# Patient Record
Sex: MALE | HISPANIC OR LATINO | Employment: PART TIME | URBAN - METROPOLITAN AREA
[De-identification: names, ages, dates, MRNs, and addresses within clinical notes are randomized per-mention and may not be internally consistent; named-entity substitution may affect disease eponyms.]

---

## 2019-07-05 ENCOUNTER — HOSPITAL ENCOUNTER (EMERGENCY)
Facility: CLINIC | Age: 19
Discharge: HOME | End: 2019-07-05
Attending: FAMILY MEDICINE | Admitting: FAMILY MEDICINE
Payer: COMMERCIAL

## 2019-07-05 ENCOUNTER — APPOINTMENT (OUTPATIENT)
Dept: GENERAL RADIOLOGY | Facility: CLINIC | Age: 19
End: 2019-07-05
Attending: FAMILY MEDICINE
Payer: COMMERCIAL

## 2019-07-05 VITALS
HEART RATE: 86 BPM | DIASTOLIC BLOOD PRESSURE: 71 MMHG | WEIGHT: 270.38 LBS | TEMPERATURE: 97.9 F | SYSTOLIC BLOOD PRESSURE: 127 MMHG | RESPIRATION RATE: 18 BRPM | OXYGEN SATURATION: 99 % | BODY MASS INDEX: 42.99 KG/M2

## 2019-07-05 DIAGNOSIS — M54.6 ACUTE LEFT-SIDED THORACIC BACK PAIN: ICD-10-CM

## 2019-07-05 PROCEDURE — 99284 EMERGENCY DEPT VISIT MOD MDM: CPT | Mod: 25 | Performed by: FAMILY MEDICINE

## 2019-07-05 PROCEDURE — 71045 X-RAY EXAM CHEST 1 VIEW: CPT

## 2019-07-05 PROCEDURE — 99284 EMERGENCY DEPT VISIT MOD MDM: CPT | Mod: Z6 | Performed by: FAMILY MEDICINE

## 2019-07-05 PROCEDURE — 72072 X-RAY EXAM THORAC SPINE 3VWS: CPT

## 2019-07-05 PROCEDURE — 25000132 ZZH RX MED GY IP 250 OP 250 PS 637: Performed by: FAMILY MEDICINE

## 2019-07-05 RX ORDER — ACETAMINOPHEN 500 MG
1000 TABLET ORAL ONCE
Status: COMPLETED | OUTPATIENT
Start: 2019-07-05 | End: 2019-07-05

## 2019-07-05 RX ADMIN — ACETAMINOPHEN 1000 MG: 500 TABLET ORAL at 12:17

## 2019-07-05 SDOH — HEALTH STABILITY: MENTAL HEALTH: HOW OFTEN DO YOU HAVE A DRINK CONTAINING ALCOHOL?: NEVER

## 2019-07-05 ASSESSMENT — ENCOUNTER SYMPTOMS: BACK PAIN: 1

## 2019-07-05 NOTE — ED AVS SNAPSHOT
[facility], [place], Emergency Department  [address]  [place] [address]  Phone:  [phone]  Fax:  [phone]                                    Jaxon Yeung   MRN: [id number]    Department:  [facility], Emergency Department   Date of Visit:  7/5/2019           After Visit Summary Signature Page    I have received my discharge instructions, and my questions have been answered. I have discussed any challenges I see with this plan with the nurse or doctor.    ..........................................................................................................................................  Patient/Patient Representative Signature      ..........................................................................................................................................  Patient Representative Print Name and Relationship to Patient    ..................................................               ................................................  Date                                   Time    ..........................................................................................................................................  Reviewed by Signature/Title    ...................................................              ..............................................  Date                                               Time          22EPIC Rev 08/18

## 2019-07-05 NOTE — ED NOTES
Pt states no left scapula/back pain since taking the Ibu PTA.  Pt has FROM/CMS intact at present.  Pt states the pain was so bad yesterday that the PT could not get out of bed fast enough and as a result urinated on himself.

## 2019-07-05 NOTE — ED PROVIDER NOTES
[facility] [place] EMERGENCY DEPARTMENT ([facility])     July 5, 2019    History     Chief Complaint   Patient presents with     Back Pain     Left upper back pain      HPI  Jaxon Yeung is a 19 year old male with history for previous L3 pars interarticularis fracture (2015), autism spectrum disorder, congenital single kidney, and cerebral palsy who presents to the ED for 2 days of ongoing upper back pain.  Patient states that he developed sudden onset of left upper back pain. No trauma. He states that the pain is made worse with standing up and movement. Patient states that he has been taking 200 mg of ibuprofen for pain and last took it sometime between 7:20 and 7:30 AM today.  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 No pertinent surgical history.  FAMILY HISTORY  Family History   Problem Relation Age of Onset     Coronary Artery Disease Mother      Asthma Mother      Cerebrovascular Disease Father      SOCIAL HISTORY  Social History     Tobacco Use     Smoking status: Never Smoker     Smokeless tobacco: Never Used   Substance Use Topics     Alcohol use: Never     Frequency: Never     MEDICATIONS  No current facility-administered medications for this encounter.      Current Outpatient Medications   Medication     acetaminophen (TYLENOL) 325 MG tablet     albuterol (2.5 MG/3ML) 0.083% nebulizer solution     ibuprofen (ADVIL,MOTRIN) 600 MG tablet     loratadine (CLARITIN) 10 MG tablet     VITAMIN D, CHOLECALCIFEROL, PO     ALLERGIES  Allergies   Allergen Reactions     Clindamycin        I have reviewed the Medications, Allergies, Past Medical and Surgical History, and Social History in the Epic system.    Review of Systems    Constitutional: Negative for chills and fever.   HENT: Negative for congestion.    Respiratory: Negative for cough and shortness of breath.    Cardiovascular: Negative for palpitations.   Gastrointestinal: Negative for nausea and vomiting.   Genitourinary: Negative for flank pain.   Musculoskeletal: Positive for back pain (upper). Negative for neck pain and neck stiffness.   Skin: Negative.    Allergic/Immunologic: Negative.    Hematological: Negative.    All other systems reviewed and are negative.      Physical Exam   BP: 127/71  Pulse: 86  Temp: 97.9  F (36.6  C)  Resp: 18  Weight: 122.6 kg (270 lb 6 oz)  SpO2: 99 %      Physical Exam   Constitutional: He is oriented to person, place, and time.   Mildly obese  No distress  To demonstrate the pain, the pt twists the upper thorax   Cardiovascular: Normal rate, regular rhythm, normal heart sounds and intact distal pulses.   No murmur heard.  Pulmonary/Chest: Effort normal and breath sounds normal. No respiratory distress. He exhibits tenderness (left lateral chest wall).   Abdominal: Soft.   Musculoskeletal: He exhibits no edema or tenderness.   Neurological: He is alert and oriented to person, place, and time.   Skin: Skin is warm and dry.   Nursing note and vitals reviewed.      ED Course        Procedures   11:05 AM  The patient was seen and examined by Dr. Rosenbaum in Room 2.           This discomfort is consistent with skeletal left posterior back. He has no hx of clots or thromboembolic issues. No immobilizations    xrays of thoracic back and cxr are normal with no lytic lesions or fractures  Labs Ordered and Resulted from Time of ED Arrival Up to the Time of Departure from the ED - No data to display         Assessments & Plan (with Medical Decision Making)     I have reviewed the nursing notes.    I have reviewed the findings, diagnosis, plan and need for follow up with the patient.       Medication List      There are no discharge medications for this  visit.         Final diagnoses:   Acute left-sided thoracic back pain     IKarri, am serving as a trained medical scribe to document services personally performed by Wade Rosenbaum MD, based on the provider's statements to me.      Wade CALLAHAN MD, was physically present and have reviewed and verified the accuracy of this note documented by Karri Richardson.     7/5/2019   [facility], [place], EMERGENCY DEPARTMENT     Wade Rosenbaum MD  07/10/19 0043

## 2019-07-05 NOTE — DISCHARGE INSTRUCTIONS
Ice to all that hurts for 10 minutes several times per day for several days  Then begin gentle warm packs  Advil 2 tablets every 4 hours  If needed as a back up, one gram of tylenol 3 times per day  If not significantly better in 3 days, recheck at your clinic or return here if cannot get in but the clinic would by far be the best- minimal waiting with appointment    You have a strain in the muscles of the upper left back- the bones appear well and there are no fractures or problems apparent.  The lungs are also fine

## 2019-07-09 ENCOUNTER — OFFICE VISIT (OUTPATIENT)
Dept: FAMILY MEDICINE | Facility: CLINIC | Age: 19
End: 2019-07-09
Payer: COMMERCIAL

## 2019-07-09 VITALS
OXYGEN SATURATION: 98 % | WEIGHT: 264.7 LBS | DIASTOLIC BLOOD PRESSURE: 70 MMHG | HEIGHT: 67 IN | BODY MASS INDEX: 41.55 KG/M2 | HEART RATE: 74 BPM | SYSTOLIC BLOOD PRESSURE: 126 MMHG

## 2019-07-09 DIAGNOSIS — F84.0 AUTISM: ICD-10-CM

## 2019-07-09 DIAGNOSIS — G80.8 OTHER CEREBRAL PALSY (H): Primary | ICD-10-CM

## 2019-07-09 DIAGNOSIS — E66.01 MORBID OBESITY (H): ICD-10-CM

## 2019-07-09 PROCEDURE — 99203 OFFICE O/P NEW LOW 30 MIN: CPT | Performed by: PHYSICIAN ASSISTANT

## 2019-07-09 ASSESSMENT — MIFFLIN-ST. JEOR: SCORE: 2164.3

## 2019-07-09 NOTE — PATIENT INSTRUCTIONS
Call weight clinic to schedule  You'll receive a call from [facility] whether you're accepted or not  Return to clinic for any new or worsening symptoms or go to ER Urgent care in off hours

## 2019-07-09 NOTE — PROGRESS NOTES
Jaxon Yeung is a 19 year old male who presents to clinic today with his mother for the following health issues:    -Patient is here for a referral to [facility]  -His older brother has been seen at [facility]  -Mother states that he has been diagnosed with mild cerebral palsy, mild autism  -He is not on any psychiatric meds, doesn't believe he needs to be  -Patient notes that seeing a nutritionist could be helpful for him to lose weight    Family Medical History  -His aunt, sister have thyroid problems    Problem list and histories reviewed & adjusted, as indicated.  Additional history: as documented    ROS:  CONSTITUTIONAL: NEGATIVE for fever, chills, change in weight  INTEGUMENTARY/SKIN: NEGATIVE for worrisome rashes, moles or lesions  EYES: NEGATIVE for vision changes or irritation  ENT/MOUTH: NEGATIVE for ear, mouth and throat problems  RESP: NEGATIVE for significant cough or SOB  BREAST: NEGATIVE for masses, tenderness or discharge  CV: NEGATIVE for chest pain, palpitations or peripheral edema  GI: NEGATIVE for nausea, abdominal pain, heartburn, or change in bowel habits  : NEGATIVE for frequency, dysuria, or hematuria  MUSCULOSKELETAL: NEGATIVE for significant arthralgias or myalgia  NEURO: NEGATIVE for weakness, dizziness or paresthesias  ENDOCRINE: NEGATIVE for temperature intolerance, skin/hair changes  HEME: NEGATIVE for bleeding problems  PSYCHIATRIC: NEGATIVE for changes in mood or affect    This document serves as a record of the services and decisions personally performed and made by Margoth Robles PA-C. It was created on her behalf by Perry Oconnell, trained medical scribe. The creation of this document is based on the provider's statements to the medical scribe.  Perry Oconnell 8:47 AM July 9, 2019    Patient Active Problem List   Diagnosis     Closed spondylolysis fracture of lumbar vertebra     Spondylolisthesis of lumbar region     Morbid obesity (H)     No past surgical history on file.   "  Social History     Tobacco Use     Smoking status: Never Smoker     Smokeless tobacco: Never Used   Substance Use Topics     Alcohol use: Never     Frequency: Never     Family History   Problem Relation Age of Onset     Coronary Artery Disease Mother      Asthma Mother      Cerebrovascular Disease Father            Labs reviewed in EPIC  Patient Active Problem List   Diagnosis     Closed spondylolysis fracture of lumbar vertebra     Spondylolisthesis of lumbar region     Morbid obesity (H)     No past surgical history on file.    Social History     Tobacco Use     Smoking status: Never Smoker     Smokeless tobacco: Never Used   Substance Use Topics     Alcohol use: Never     Frequency: Never     Family History   Problem Relation Age of Onset     Coronary Artery Disease Mother      Asthma Mother      Cerebrovascular Disease Father          Current Outpatient Medications   Medication Sig Dispense Refill     loratadine (CLARITIN) 10 MG tablet Take 10 mg by mouth daily       VITAMIN D, CHOLECALCIFEROL, PO Take 1,000 Units by mouth daily       acetaminophen (TYLENOL) 325 MG tablet Take 1-2 q 8 hours prn pain (Patient not taking: Reported on 7/9/2019) 100 tablet 0     albuterol (2.5 MG/3ML) 0.083% nebulizer solution Take 1 vial by nebulization every 6 hours as needed for shortness of breath / dyspnea or wheezing       ibuprofen (ADVIL,MOTRIN) 600 MG tablet Take 1 tablet (600 mg) by mouth every 6 hours as needed for pain (Patient not taking: Reported on 7/9/2019) 60 tablet 0       OBJECTIVE:                                                    /70   Pulse 74   Ht 1.702 m (5' 7\")   Wt 120.1 kg (264 lb 11.2 oz)   SpO2 98%   BMI 41.46 kg/m   Body mass index is 41.46 kg/m .   GENERAL:: healthy, alert and no distress  EYES: Eyes grossly normal to inspection, extraocular movements - intact, and PERRL  HENT: ear canals- normal; TMs- normal; Nose- normal; Mouth- no ulcers, no lesions  NECK: no tenderness, no adenopathy, " "no asymmetry, no masses, no stiffness; thyroid- normal to palpation  RESP: lungs clear to auscultation - no rales, no rhonchi, no wheezes  CV: regular rates and rhythm, normal S1 S2, no S3 or S4 and no murmur, no click or rub -  MS: extremities- no gross deformities noted, no edema  SKIN: no suspicious lesions, no rashes  NEURO: strength and tone- normal, sensory exam- grossly normal, mentation- intact, speech- normal, reflexes- symmetric  PSYCH: Alert and oriented times 3; speech- coherent , normal rate and volume; able to articulate logical thoughts, able to abstract reason, no tangential thoughts, no hallucinations or delusions, affect- normal    No results found for this or any previous visit (from the past 24 hour(s)).       ASSESSMENT/PLAN:                                                        ICD-10-CM    1. Other cerebral palsy (H) G80.8 [facility] REFERRAL   2. Morbid obesity (H) E66.01 [facility] REFERRAL     BARIATRIC ADULT REFERRAL   3. Autism F84.0 [facility] REFERRAL     Advised patient he may not qualify for [facility], but he and his mother insisted we try to get him. See referral note. See below. Return to clinic for any new or worsening symptoms or go to ER Urgent care in off hours     Patient Instructions   Call weight clinic to schedule  You'll receive a call from [facility] whether you're accepted or not  Return to clinic for any new or worsening symptoms or go to ER Urgent care in off hours        Estimated body mass index is 41.46 kg/m  as calculated from the following:    Height as of this encounter: 1.702 m (5' 7\").    Weight as of this encounter: 120.1 kg (264 lb 11.2 oz).     The information in this document, created by the medical scribe for me, accurately reflects the services I personally performed and the decisions made by me. I have reviewed and approved this document for accuracy prior to leaving the patient care area.  July 9, 2019 8:47 AM    Margoth" Dania Robles  [facility] [place]

## 2019-07-10 ASSESSMENT — ENCOUNTER SYMPTOMS
ALLERGIC/IMMUNOLOGIC NEGATIVE: 1
PALPITATIONS: 0
HEMATOLOGIC/LYMPHATIC NEGATIVE: 1
COUGH: 0
SHORTNESS OF BREATH: 0
CHILLS: 0
NAUSEA: 0
NECK STIFFNESS: 0
FLANK PAIN: 0
NECK PAIN: 0
FEVER: 0
VOMITING: 0

## 2022-02-16 ENCOUNTER — LAB (OUTPATIENT)
Dept: LAB | Facility: CLINIC | Age: 22
End: 2022-02-16
Payer: COMMERCIAL

## 2022-02-16 ENCOUNTER — OFFICE VISIT (OUTPATIENT)
Dept: FAMILY MEDICINE | Facility: CLINIC | Age: 22
End: 2022-02-16
Payer: COMMERCIAL

## 2022-02-16 VITALS
WEIGHT: 265.2 LBS | HEART RATE: 61 BPM | HEIGHT: 67 IN | SYSTOLIC BLOOD PRESSURE: 128 MMHG | DIASTOLIC BLOOD PRESSURE: 82 MMHG | BODY MASS INDEX: 41.62 KG/M2 | OXYGEN SATURATION: 97 %

## 2022-02-16 DIAGNOSIS — E66.01 MORBID OBESITY (H): ICD-10-CM

## 2022-02-16 DIAGNOSIS — Z00.00 HEALTHCARE MAINTENANCE: ICD-10-CM

## 2022-02-16 DIAGNOSIS — Q60.0 CONGENITAL ABSENCE OF ONE KIDNEY: ICD-10-CM

## 2022-02-16 DIAGNOSIS — Z00.00 HEALTHCARE MAINTENANCE: Primary | ICD-10-CM

## 2022-02-16 DIAGNOSIS — J45.20 MILD INTERMITTENT ASTHMA WITHOUT COMPLICATION: ICD-10-CM

## 2022-02-16 LAB
ALBUMIN SERPL-MCNC: 4 G/DL (ref 3.4–5)
ALP SERPL-CCNC: 88 U/L (ref 40–150)
ALT SERPL W P-5'-P-CCNC: 48 U/L (ref 0–70)
ANION GAP SERPL CALCULATED.3IONS-SCNC: 8 MMOL/L (ref 3–14)
AST SERPL W P-5'-P-CCNC: 17 U/L (ref 0–45)
BILIRUB SERPL-MCNC: 0.8 MG/DL (ref 0.2–1.3)
BUN SERPL-MCNC: 16 MG/DL (ref 7–30)
CALCIUM SERPL-MCNC: 8.9 MG/DL (ref 8.5–10.1)
CHLORIDE BLD-SCNC: 106 MMOL/L (ref 94–109)
CHOLEST SERPL-MCNC: 171 MG/DL
CO2 SERPL-SCNC: 27 MMOL/L (ref 20–32)
CREAT SERPL-MCNC: 1.02 MG/DL (ref 0.66–1.25)
DEPRECATED CALCIDIOL+CALCIFEROL SERPL-MC: 25 UG/L (ref 20–75)
FASTING STATUS PATIENT QL REPORTED: YES
GFR SERPL CREATININE-BSD FRML MDRD: >90 ML/MIN/1.73M2
GLUCOSE BLD-MCNC: 95 MG/DL (ref 70–99)
HBA1C MFR BLD: 5.4 % (ref 0–5.6)
HDLC SERPL-MCNC: 27 MG/DL
LDLC SERPL CALC-MCNC: 98 MG/DL
NONHDLC SERPL-MCNC: 144 MG/DL
POTASSIUM BLD-SCNC: 3.8 MMOL/L (ref 3.4–5.3)
PROT SERPL-MCNC: 7.7 G/DL (ref 6.8–8.8)
SODIUM SERPL-SCNC: 141 MMOL/L (ref 133–144)
TRIGL SERPL-MCNC: 230 MG/DL
TSH SERPL DL<=0.005 MIU/L-ACNC: 3.11 MU/L (ref 0.4–4)

## 2022-02-16 PROCEDURE — 82306 VITAMIN D 25 HYDROXY: CPT | Mod: 90 | Performed by: PATHOLOGY

## 2022-02-16 PROCEDURE — 80053 COMPREHEN METABOLIC PANEL: CPT | Performed by: PATHOLOGY

## 2022-02-16 PROCEDURE — 80061 LIPID PANEL: CPT | Performed by: PATHOLOGY

## 2022-02-16 PROCEDURE — 99385 PREV VISIT NEW AGE 18-39: CPT | Performed by: NURSE PRACTITIONER

## 2022-02-16 PROCEDURE — 36415 COLL VENOUS BLD VENIPUNCTURE: CPT | Performed by: PATHOLOGY

## 2022-02-16 PROCEDURE — 99000 SPECIMEN HANDLING OFFICE-LAB: CPT | Performed by: PATHOLOGY

## 2022-02-16 PROCEDURE — 84443 ASSAY THYROID STIM HORMONE: CPT | Performed by: PATHOLOGY

## 2022-02-16 PROCEDURE — 83036 HEMOGLOBIN GLYCOSYLATED A1C: CPT | Performed by: PATHOLOGY

## 2022-02-16 RX ORDER — ALBUTEROL SULFATE 90 UG/1
2 AEROSOL, METERED RESPIRATORY (INHALATION) EVERY 6 HOURS PRN
Qty: 8 G | Refills: 1 | Status: SHIPPED | OUTPATIENT
Start: 2022-02-16 | End: 2024-08-28

## 2022-02-16 ASSESSMENT — ENCOUNTER SYMPTOMS
SORE THROAT: 0
WEAKNESS: 0
HEADACHES: 0
NUMBNESS: 0
ABDOMINAL PAIN: 0
FATIGUE: 0
BRUISES/BLEEDS EASILY: 0
DYSPHORIC MOOD: 0
DIARRHEA: 0
MYALGIAS: 0
NAUSEA: 0
NERVOUS/ANXIOUS: 0
CHILLS: 0
EYE DISCHARGE: 0
JOINT SWELLING: 0
COUGH: 0
DIFFICULTY URINATING: 0
PALPITATIONS: 0
WHEEZING: 0
WOUND: 0
VOMITING: 0
COLOR CHANGE: 1
FEVER: 0
ADENOPATHY: 0
SHORTNESS OF BREATH: 0

## 2022-02-16 NOTE — PATIENT INSTRUCTIONS
- Use Cerave Lotion 2x/day for dry hands  - No more Q-tips in ears.  Use over-the- counter Debox for ear wax.  - I have ordered labs, please obtain them at your nearest convenience.      - Patient Education     Exercise for a Healthier Heart  You may wonder how you can improve the health of your heart. If you re thinking about exercise, you re on the right track. You don t need to become an athlete. But you do need a certain amount of brisk exercise to help strengthen your heart. If you have been diagnosed with a heart condition, your healthcare provider may advise exercise to help stabilize your condition. To help make exercise a habit, choose safe, fun activities.      Exercise with a friend. When activity is fun, you're more likely to stick with it.   Before you start  Check with your healthcare provider before starting an exercise program. This is especially important if you have not been active for a while. It's also important if you have a long-term (chronic) health problem such as heart disease, diabetes, or obesity. Or if you are at high risk for having these problems.   Why exercise?  Exercising regularly offers many healthy rewards. It can help you do all of the following:     Improve your blood cholesterol level to help prevent further heart trouble    Lower your blood pressure to help prevent a stroke or heart attack    Control diabetes, or reduce your risk of getting this disease    Improve your heart and lung function    Reach and stay at a healthy weight    Make your muscles stronger so you can stay active    Prevent falls and fractures by slowing the loss of bone mass (osteoporosis)    Manage stress better    Reduce your blood pressure    Improve your sense of self and your body image  Exercise tips      Ease into your routine. Set small goals. Then build on them. If you are not sure what your activity level should be, talk with your healthcare provider first before starting an exercise  routine.    Exercise on most days. Aim for a total of 150 minutes (2 hours and 30 minutes) or more of moderate-intensity aerobic activity each week. Or 75 minutes (1 hour and 15 minutes) or more of vigorous-intensity aerobic activity each week. Or try for a combination of both. Moderate activity means that you breathe heavier and your heart rate increases but you can still talk. Think about doing 40 minutes of moderate exercise, 3 to 4 times a week. For best results, activity should last for about 40 minutes to lower blood pressure and cholesterol. It's OK to work up to the 40-minute period over time. Examples of moderate-intensity activity are walking 1 mile in 15 minutes. Or doing 30 to 45 minutes of yard work.    Step up your daily activity level.  Along with your exercise program, try being more active the whole day. Walk instead of drive. Or park further away so that you take more steps each day. Do more household tasks or yard work. You may not be able to meet the advised mount of physical activity. But doing some moderate- or vigorous-intensity aerobic activity can help reduce your risk for heart disease. Your healthcare provider can help you figure out what is best for you.    Choose 1 or more activities you enjoy.  Walking is one of the easiest things you can do. You can also try swimming, riding a bike, dancing, or taking an exercise class.    When to call your healthcare provider  Call your healthcare provider if you have any of these:     Chest pain or feel dizzy or lightheaded    Burning, tightness, pressure, or heaviness in your chest, neck, shoulders, back, or arms    Abnormal shortness of breath    More joint or muscle pain    A very fast or irregular heartbeat (palpitations)  Je last reviewed this educational content on 7/1/2019 2000-2021 [employer]. All rights reserved. This information is not intended as a substitute for professional medical care. Always follow your  healthcare professional's instructions.

## 2022-02-16 NOTE — PROGRESS NOTES
"Today's Date: Feb 16, 2022     Patient Jaxon Yeung 2000 presents to the clinic today to address   Chief Complaint   Patient presents with     Establish Care     Issues with dry skin             SUBJECTIVE     History of Present Illness: 21 year old male presents for physical exam. Patient reports that he has been having dry hands during the winter months. Currently not using any lotion. Denies any associated symptoms including redness, swelling, warmth, fevers. No other acute complaints/symptoms at time of exam.    Hx Asthma- Endorses inhaler use once \"every few months.\" Denies current cough, SOB, wheezing.    Hx Low vitamin D however no past results noted.      Review of Systems   Constitutional: Negative for chills, fatigue and fever.   HENT: Negative for congestion and sore throat.    Eyes: Negative for discharge and visual disturbance.   Respiratory: Negative for cough, shortness of breath and wheezing.    Cardiovascular: Negative for chest pain, palpitations and peripheral edema.   Gastrointestinal: Negative for abdominal pain, diarrhea, nausea and vomiting.   Endocrine: Negative for cold intolerance and heat intolerance.   Genitourinary: Negative for difficulty urinating.   Musculoskeletal: Negative for joint swelling and myalgias.   Skin: Positive for color change (Endorses dry skin to hands). Negative for rash and wound.   Allergic/Immunologic: Negative for environmental allergies and food allergies.   Neurological: Negative for weakness, numbness and headaches.   Hematological: Negative for adenopathy. Does not bruise/bleed easily.   Psychiatric/Behavioral: Negative for dysphoric mood and suicidal ideas. The patient is not nervous/anxious.          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     Immunization History   Administered Date(s) Administered     DTAP (<7y) 2000, 2000, 1998, 08/26/1999, 09/17/2002     DTAP-IPV/HIB (PENTACEL) 1998     FLU 6-35 months 11/05/2009, 11/04/2013     HPV Quadrivalent 07/25/2012, 08/20/2013, 03/12/2014     Hep B, Peds or Adolescent 1998     HepA-ped 2 Dose 01/17/2008, 05/14/2009     HepB, Unspecified 1998, 01/12/1999     Hepb Ig, Im (hbig) 1998     Hib, Unspecified 1998, 1998     Historic Hib Prohibit 1998, 08/26/1999     Historical DTP/aP 05/25/1999     Influenza (IIV3) PF 11/04/1999, 02/01/2005, 01/17/2008, 08/23/2012     Influenza Vaccine, 6+MO IM (QUADRIVALENT W/PRESERVATIVES) 11/04/2015, 02/07/2017     MMR 05/25/1999, 09/17/2002     Meningococcal (Menactra ) 09/08/2010     Meningococcal (Menveo ) 08/12/2014     OPV, trivalent, live 08/26/1999     Poliovirus, inactivated (IPV) 1998, 1998, 05/25/1999, 09/17/2002     TDAP Vaccine (Adacel) 05/14/2009     Varicella 05/25/1999, 01/17/2008        Health Maintenance components reviewed - Seasonal Influenza vaccine status is due & Covid-19 vaccine status is due. Patient declined both.         OBJECTIVE     /82 (BP  "Location: Right arm, Patient Position: Sitting, Cuff Size: Adult Large)   Pulse 61   Ht 1.702 m (5' 7\")   Wt 120.3 kg (265 lb 3.2 oz)   SpO2 97%   BMI 41.54 kg/m       Labs:  No results found for: WBC, HGB, HCT, PLT, CHOL, TRIG, HDL, LDLDIRECT, ALT, AST, NA, CREATININE, BUN, CO2, TSH, PSA, INR, GLUF, HGBA1C, MICROALBUR     Physical Exam  Constitutional:       General: He is not in acute distress.     Appearance: He is well-developed. He is obese. He is not toxic-appearing.   HENT:      Right Ear: Tympanic membrane and external ear normal.      Left Ear: Tympanic membrane and external ear normal.      Ears:      Comments: Bilateral EACs with cerumen and dried blood.     Nose: Nose normal.      Mouth/Throat:      Mouth: No oral lesions.      Pharynx: No oropharyngeal exudate.   Eyes:      General:         Right eye: No discharge.         Left eye: No discharge.      Conjunctiva/sclera: Conjunctivae normal.      Pupils: Pupils are equal, round, and reactive to light.   Neck:      Thyroid: No thyromegaly.      Trachea: No tracheal deviation.   Cardiovascular:      Rate and Rhythm: Normal rate and regular rhythm.      Pulses: Normal pulses.      Heart sounds: Normal heart sounds, S1 normal and S2 normal. No murmur heard.    No S3 or S4 sounds.   Pulmonary:      Effort: Pulmonary effort is normal. No respiratory distress.      Breath sounds: Normal breath sounds. No wheezing, rhonchi or rales.   Abdominal:      General: Bowel sounds are normal.      Palpations: Abdomen is soft. There is no mass.      Tenderness: There is no abdominal tenderness. Negative signs include Wallis's sign and McBurney's sign.   Genitourinary:     Comments: Deferred by pt.  Musculoskeletal:         General: No deformity. Normal range of motion.      Cervical back: Neck supple.   Lymphadenopathy:      Cervical: No cervical adenopathy.   Skin:     General: Skin is warm and dry.      Findings: No lesion or rash.             Comments: Xerosis to " "bilateral hands.   Neurological:      Mental Status: He is alert and oriented to person, place, and time.      Motor: No weakness or abnormal muscle tone.      Deep Tendon Reflexes: Reflexes are normal and symmetric.      Reflex Scores:       Patellar reflexes are 2+ on the right side and 2+ on the left side.  Psychiatric:         Speech: Speech normal.         Thought Content: Thought content normal.         Judgment: Judgment normal.               ASSESSMENT/PLAN   1. Healthcare maintenance  Patient refused all vaccines. Advised COVID/Flu vaccine given concurrent obesity/asthma hx, pt declined again after discussion.  Bilateral EACs w/ blood- pt denies ear itching, pain, discharge. Endorses frequent Qtip use- instructed to stop qtips and start debrox.   Bilateral hand -xerosis- Advised Cerave lotion BID.  Will check vitamin d level, replacement could have been started given PMHx Cerebral Palsy.    - Vitamin D Level; Future    2. Mild intermittent asthma without complication  Patient endorses inhaler use once \"every few months.\" Lung sounds CTA.  - albuterol (PROAIR HFA/PROVENTIL HFA/VENTOLIN HFA) 108 (90 Base) MCG/ACT inhaler; Inhale 2 puffs into the lungs every 6 hours as needed for shortness of breath / dyspnea or wheezing  Dispense: 8 g; Refill: 1    3. Morbid obesity (H)  Discussed diet/lifestyle modifications.  - Comprehensive metabolic panel; Future  - Lipid panel reflex to direct LDL Fasting; Future  - TSH with free T4 reflex; Future  - Hemoglobin A1c; Future    4. Congenital absence of one kidney  - Comprehensive metabolic panel; Future        Education provided on at-home supportive measures including diet/lifestyle modifications. No Qtips for ear cerumen.    Follow-Up:  - Follow up in as needed, or if symptoms worsen or fail to improve.     Options for treatment and follow-up care were reviewed with the patient. Patient engaged in the decision making process and verbalized understanding of the options " discussed and agreed with the final plan.  AVS printed and given to patient.    PRUDENCE Valentin [facility]  [facility]  [address] [address] [phone]

## 2022-02-17 ENCOUNTER — TELEPHONE (OUTPATIENT)
Dept: FAMILY MEDICINE | Facility: CLINIC | Age: 22
End: 2022-02-17
Payer: COMMERCIAL

## 2022-02-17 NOTE — TELEPHONE ENCOUNTER
Telephone conversation (5 minutes): Discussed yesterday's lab results with the patient.     Vitamin D on low side of normal- continue replacement.    High TRGs- advised diet/lifestyle modifications including moderating carb/sat fat intake.  F/U for recheck in approx 6 months or prn for any other concerns.    All questions/concerns addressed. Patient stated understanding/agreement to plan of care.      PRUDENCE Valentin, CNP  [facility] [employer]

## 2022-02-25 ENCOUNTER — OFFICE VISIT (OUTPATIENT)
Dept: FAMILY MEDICINE | Facility: CLINIC | Age: 22
End: 2022-02-25
Payer: COMMERCIAL

## 2022-02-25 ENCOUNTER — LAB (OUTPATIENT)
Dept: LAB | Facility: CLINIC | Age: 22
End: 2022-02-25
Payer: COMMERCIAL

## 2022-02-25 VITALS
DIASTOLIC BLOOD PRESSURE: 83 MMHG | HEART RATE: 92 BPM | OXYGEN SATURATION: 99 % | WEIGHT: 265 LBS | TEMPERATURE: 98.1 F | SYSTOLIC BLOOD PRESSURE: 131 MMHG | HEIGHT: 67 IN | BODY MASS INDEX: 41.59 KG/M2

## 2022-02-25 DIAGNOSIS — R19.7 DIARRHEA, UNSPECIFIED TYPE: ICD-10-CM

## 2022-02-25 DIAGNOSIS — R19.5 DARK STOOLS: ICD-10-CM

## 2022-02-25 DIAGNOSIS — R19.7 DIARRHEA, UNSPECIFIED TYPE: Primary | ICD-10-CM

## 2022-02-25 DIAGNOSIS — R20.9 ALTERATIONS OF SENSATIONS: ICD-10-CM

## 2022-02-25 LAB
HEMOCCULT STL QL IA: NEGATIVE
HGB BLD-MCNC: 16.8 G/DL (ref 13.3–17.7)

## 2022-02-25 PROCEDURE — 99213 OFFICE O/P EST LOW 20 MIN: CPT | Performed by: NURSE PRACTITIONER

## 2022-02-25 PROCEDURE — 36415 COLL VENOUS BLD VENIPUNCTURE: CPT | Performed by: PATHOLOGY

## 2022-02-25 PROCEDURE — 82274 ASSAY TEST FOR BLOOD FECAL: CPT | Mod: 90 | Performed by: PATHOLOGY

## 2022-02-25 PROCEDURE — 99000 SPECIMEN HANDLING OFFICE-LAB: CPT | Performed by: PATHOLOGY

## 2022-02-25 PROCEDURE — 85018 HEMOGLOBIN: CPT | Performed by: PATHOLOGY

## 2022-02-25 RX ORDER — LOPERAMIDE HYDROCHLORIDE 2 MG/1
2 TABLET ORAL 4 TIMES DAILY PRN
Qty: 56 TABLET | Refills: 0 | Status: SHIPPED | OUTPATIENT
Start: 2022-02-25 | End: 2022-03-11

## 2022-02-25 NOTE — PATIENT INSTRUCTIONS
Patient Education     Diarrhea with Uncertain Cause (Adult)    Diarrhea is when stools are loose and watery. This can be caused by:    Viral infections    Bacterial infections    Food poisoning    Parasites    Irritable bowel syndrome (IBS)    Inflammatory bowel diseases such as ulcerative colitis, Crohn's disease, and celiac disease    Food intolerance, such as to lactose, the sugar found in milk and milk products    Reaction to medicines like antibiotics, laxatives, cancer drugs, and antacids  Along with diarrhea, you may also have:    Abdominal pain and cramping    Nausea and vomiting    Loss of bowel control    Fever and chills    Bloody stools  In some cases, antibiotics may help to treat diarrhea. You may have a stool sample test. This is done to see what is causing your diarrhea, and if antibiotics will help treat it. The results of a stool sample test may take up to 2 days. The healthcare provider may not give you antibiotics until he or she has the stool test results.  Diarrhea can cause dehydration. This is the loss of too much water and other fluids from the body. When this occurs, body fluid must be replaced. This can be done with oral rehydration solutions. Oral rehydration solutions are available at drugstores and grocery stores without a prescription. Sports drinks are not the best choice if you are very dehydrated. They have too much sugar and not enough electrolytes.  Home care  Follow all instructions given by your healthcare provider. Rest at home for the next 24 hours, or until you feel better. Avoid caffeine, tobacco, and alcohol. These can make diarrhea, cramping, and pain worse.  If taking medicines:    Over-the-counter nausea and diarrhea medicines are generally OK unless you experience fever or blood stool. Check with your doctor first in those circumstances.    You may use acetaminophen or NSAID medicines like ibuprofen or naproxen to reduce pain and fever. Don t use these if you have  chronic liver or kidney disease, or ever had a stomach ulcer or gastrointestinal bleeding. Don't use NSAID medicines if you are already taking one for another condition (like arthritis) or are on daily aspirin therapy (such as for heart disease or after a stroke). Talk with your healthcare provider first.    If antibiotics were prescribed, be sure you take them until they are finished. Don t stop taking them even when you feel better. Antibiotics must be taken as a full course.  To prevent the spread of illness:    Remember that washing with soap and water and using alcohol-based  is the best way to prevent the spread of infection. Dry your hands with a single use towel (like a paper towel).    Clean the toilet after each use.    Wash your hands before eating.    Wash your hands before and after preparing food. Keep in mind that people with diarrhea or vomiting should not prepare food for others.    Wash your hands after using cutting boards, countertops, and knives that have been in contact with raw foods.    Wash and then peel fruits and vegetables.    Keep uncooked meats away from cooked and ready-to-eat foods.    Use a food thermometer when cooking. Cook poultry to at least 165 F (74 C). Cook ground meat (beef, veal, pork, lamb) to at least 160 F (71 C). Cook fresh beef, veal, lamb, and pork to at least 145 F (63 C).    Don t eat raw or undercooked eggs (poached or sergio side up), poultry, meat, or unpasteurized milk and juices.  Food and drinks  The main goal while treating vomiting or diarrhea is to prevent dehydration. This is done by taking small amounts of liquids often.    Keep in mind that liquids are more important than food right now.    Drink only small amounts of liquids at a time.    Don t force yourself to eat, especially if you are having cramping, vomiting, or diarrhea. Don t eat large amounts at a time, even if you are hungry.    If you eat, avoid fatty, greasy, spicy, or fried  foods.    Don t eat dairy foods or drink milk if you have diarrhea. These can make diarrhea worse.  During the first 24 hours you can try:    Oral rehydration solutions.  Sports drinks may be used if you are not too dehydrated and are otherwise healthy.    Soft drinks without caffeine    Ginger ale    Water (plain or flavored)    Decaf tea or coffee    Clear broth, consommé, or bouillon    Gelatin, popsicles, or frozen fruit juice bars  The second 24 hours, if you are feeling better, you can add:    Hot cereal, plain toast, bread, rolls, or crackers    Plain noodles, rice, mashed potatoes, chicken noodle soup, or rice soup    Unsweetened canned fruit (no pineapple)    Bananas  As you recover:    Limit fat intake to less than 15 grams per day. Don t eat margarine, butter, oils, mayonnaise, sauces, gravies, fried foods, peanut butter, meat, poultry, or fish.    Limit fiber. Don t eat raw or cooked vegetables, fresh fruits except bananas, or bran cereals.    Limit caffeine and chocolate.    Limit dairy.    Don t use spices or seasonings except salt.    Go back to your normal diet over time, as you feel better and your symptoms improve.    If the symptoms come back, go back to a simple diet or clear liquids.  Follow-up care  Follow up with your healthcare provider, or as advised. If a stool sample was taken or cultures were done, call the healthcare provider for the results as instructed.  Call 911  Call 911 if you have any of these symptoms:    Trouble breathing    Confusion    Extreme drowsiness or trouble walking    Loss of consciousness    Rapid heart rate    Chest pain    Stiff neck    Seizure  When to seek medical advice  Call your healthcare provider right away if any of these occur:    Abdominal pain that gets worse    Constant lower right abdominal pain    Continued vomiting and inability to keep liquids down    Diarrhea more than 5 times a day    Blood in vomit or stool    Dark urine or no urine for 8 hours,  dry mouth and tongue, tiredness, weakness, or dizziness    Drowsiness    New rash    You don t get better in 2 to 3 days    Fever of 100.4 F (38 C) or higher, or as directed by your healthcare provider  Je last reviewed this educational content on 6/1/2018 2000-2021 [employer]. All rights reserved. This information is not intended as a substitute for professional medical care. Always follow your healthcare professional's instructions.

## 2022-02-25 NOTE — NURSING NOTE
Chief Complaint   Patient presents with     Musculoskeletal Problem     Right leg pulsating past 2-3 days     Diarrhea     tried pepto bismo has not helped, dark in color, for the past 2 days     Li CHAVEZ MA 10:12 AM 2/25/2022

## 2022-02-25 NOTE — PROGRESS NOTES
"Jaxon Yeung is a 21 year old male who presents today for diarrhea, leg sensation    1-diarrhea  O:48 hours   L:N/A  D: every 30 min for a few hours   C:dark in color  A: none   R: none   T: pepto-bismol tablets - not helping     Patient denies any bright red blood in stool, unsure if distinct change in odor. Denies any abdominal pain, fever. Drinking a lot of water. Per patient states that mother ( who he lives with also had diarrhea recently, Lasted a couple of days)    2-Right leg pulsing  O: 4 days ago   L: right leg  D: 2 days - not present now.   C: \"pulsating\"   A: none   R: none   T: none     Patient denies any injury to the area. Never painful.           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 No pertinent surgical history.  Social History     Socioeconomic History     Marital status: Single     Spouse name: Not on file     Number of children: Not on file     Years of education: Not on file     Highest education level: Not on file   Occupational History     Not on file   Tobacco Use     Smoking status: Never Smoker     Smokeless tobacco: Never Used   Vaping Use     Vaping Use: Never used   Substance and Sexual Activity     Alcohol use: Never     Drug use: Never     Sexual activity: Never   Other Topics Concern     Not on file   Social History Narrative     Not on file     Social Determinants of Health     Financial Resource Strain: Not on file   Food " "Insecurity: Not on file   Transportation Needs: Not on file   Physical Activity: Not on file   Stress: Not on file   Social Connections: Not on file   Intimate Partner Violence: Not on file   Housing Stability: Not on file     Family History   Problem Relation Age of Onset     Coronary Artery Disease Mother      Asthma Mother      Cerebrovascular Disease Father      Prostate Cancer Father        /83   Pulse 92   Temp 98.1  F (36.7  C) (Oral)   Ht 1.702 m (5' 7\")   Wt 120.2 kg (265 lb)   SpO2 99%   BMI 41.50 kg/m      Exam:  Constitutional: healthy, alert and no distress  Head: Normocephalic. No masses, lesions, tenderness or abnormalities  Cardiovascular: negative, PMI normal. No lifts, heaves, or thrills. RRR. No murmurs, clicks gallops or rub. Right DP pulses present, right LE warm, skin dry. Hair growth present. No obvious varicosities.   Respiratory: negative, Percussion normal. Good diaphragmatic excursion. Lungs clear  Gastrointestinal: Abdomen soft, non-tender. BS normal. No masses, organomegaly  Musculoskeletal: extremities normal- no gross deformities noted, gait normal and normal muscle tone  Skin: no suspicious lesions or rashes  Neurologic: Gait normal. Reflexes normal and symmetric. Sensation grossly WNL.  Psychiatric: mentation appears normal and affect normal/bright      Assessment/Plan:  (R19.7) Diarrhea, unspecified type  (primary encounter diagnosis)  Comment: R/O GI bleed   Plan: Occult blood stool, Hemoglobin, loperamide (IMODIUM A-D) 2 MG tablet           (R19.5) Dark stools  Comment: R/O GI bleed   Plan: Occult blood stool, Hemoglobin          (R20.9) Alterations of sensations  Comment:Discusssed that there is no obvious vascular change happening- patient is not experiencing pain. Offered reassurance.   Plan: Follow up as needed if recurs. Consider ultrasound.     PRUDENCE Villatoro CNP          "

## 2022-02-28 NOTE — RESULT ENCOUNTER NOTE
Can you please contact this patient and let him know that his stool was negative for any blood and his hemoglobin is normal. Thank you PRUDENCE Villatoro CNP

## 2022-03-31 ENCOUNTER — TELEPHONE (OUTPATIENT)
Dept: FAMILY MEDICINE | Facility: CLINIC | Age: 22
End: 2022-03-31

## 2022-03-31 NOTE — TELEPHONE ENCOUNTER
"Called and LVM for patient to check in on them and to relay information from Delfina if answered, just asked how they were doing and if they had any questions to give us a call, did not relay message.   \"Can you please contact this patient and let him know that his stool was negative for any blood and his hemoglobin is normal. Thank you Delfina Flynn, PRUDENCE CNP\"    Li CHAVEZ MA 10:02 AM 3/31/2022    "

## 2022-05-13 ENCOUNTER — OFFICE VISIT (OUTPATIENT)
Dept: FAMILY MEDICINE | Facility: CLINIC | Age: 22
End: 2022-05-13
Payer: COMMERCIAL

## 2022-05-13 ENCOUNTER — LAB (OUTPATIENT)
Dept: LAB | Facility: CLINIC | Age: 22
End: 2022-05-13

## 2022-05-13 ENCOUNTER — TELEPHONE (OUTPATIENT)
Dept: FAMILY MEDICINE | Facility: CLINIC | Age: 22
End: 2022-05-13

## 2022-05-13 VITALS
HEART RATE: 85 BPM | BODY MASS INDEX: 39.99 KG/M2 | OXYGEN SATURATION: 98 % | TEMPERATURE: 98.4 F | WEIGHT: 254.8 LBS | SYSTOLIC BLOOD PRESSURE: 134 MMHG | HEIGHT: 67 IN | DIASTOLIC BLOOD PRESSURE: 80 MMHG

## 2022-05-13 DIAGNOSIS — R10.84 ABDOMINAL PAIN, GENERALIZED: ICD-10-CM

## 2022-05-13 DIAGNOSIS — R82.998 DARK URINE: ICD-10-CM

## 2022-05-13 DIAGNOSIS — R74.8 ELEVATED LIVER ENZYMES: Primary | ICD-10-CM

## 2022-05-13 DIAGNOSIS — R82.998 DARK URINE: Primary | ICD-10-CM

## 2022-05-13 DIAGNOSIS — R74.8 ELEVATED LIVER ENZYMES: ICD-10-CM

## 2022-05-13 LAB
ALBUMIN SERPL-MCNC: 3.9 G/DL (ref 3.4–5)
ALBUMIN UR-MCNC: NEGATIVE MG/DL
ALP SERPL-CCNC: 129 U/L (ref 40–150)
ALT SERPL W P-5'-P-CCNC: 448 U/L (ref 0–70)
ANION GAP SERPL CALCULATED.3IONS-SCNC: 7 MMOL/L (ref 3–14)
APPEARANCE UR: CLEAR
AST SERPL W P-5'-P-CCNC: 202 U/L (ref 0–45)
BILIRUB SERPL-MCNC: 3.9 MG/DL (ref 0.2–1.3)
BILIRUB UR QL STRIP: ABNORMAL
BUN SERPL-MCNC: 11 MG/DL (ref 7–30)
CALCIUM SERPL-MCNC: 9.1 MG/DL (ref 8.5–10.1)
CHLORIDE BLD-SCNC: 103 MMOL/L (ref 94–109)
CO2 SERPL-SCNC: 30 MMOL/L (ref 20–32)
COLOR UR AUTO: ABNORMAL
CREAT SERPL-MCNC: 1.03 MG/DL (ref 0.66–1.25)
FERRITIN SERPL-MCNC: 38 NG/ML (ref 26–388)
GFR SERPL CREATININE-BSD FRML MDRD: >90 ML/MIN/1.73M2
GLUCOSE BLD-MCNC: 99 MG/DL (ref 70–99)
GLUCOSE UR STRIP-MCNC: NEGATIVE MG/DL
HBV SURFACE AG SERPL QL IA: NONREACTIVE
HCV AB SERPL QL IA: NONREACTIVE
HGB UR QL STRIP: NEGATIVE
KETONES UR STRIP-MCNC: 160 MG/DL
LEUKOCYTE ESTERASE UR QL STRIP: NEGATIVE
MUCOUS THREADS #/AREA URNS LPF: PRESENT /LPF
NITRATE UR QL: NEGATIVE
PH UR STRIP: 6.5 [PH] (ref 5–7)
POTASSIUM BLD-SCNC: 3.7 MMOL/L (ref 3.4–5.3)
PROT SERPL-MCNC: 7.6 G/DL (ref 6.8–8.8)
RBC URINE: <1 /HPF
SODIUM SERPL-SCNC: 140 MMOL/L (ref 133–144)
SP GR UR STRIP: 1.01 (ref 1–1.03)
SQUAMOUS EPITHELIAL: <1 /HPF
TRANSFERRIN SERPL-MCNC: 307 MG/DL (ref 210–360)
UROBILINOGEN UR STRIP-MCNC: NORMAL MG/DL
WBC URINE: 2 /HPF

## 2022-05-13 PROCEDURE — 81001 URINALYSIS AUTO W/SCOPE: CPT | Performed by: PATHOLOGY

## 2022-05-13 PROCEDURE — 99212 OFFICE O/P EST SF 10 MIN: CPT | Performed by: NURSE PRACTITIONER

## 2022-05-13 PROCEDURE — 87340 HEPATITIS B SURFACE AG IA: CPT | Mod: 90 | Performed by: PATHOLOGY

## 2022-05-13 PROCEDURE — 84466 ASSAY OF TRANSFERRIN: CPT | Mod: 90 | Performed by: PATHOLOGY

## 2022-05-13 PROCEDURE — 36415 COLL VENOUS BLD VENIPUNCTURE: CPT | Performed by: PATHOLOGY

## 2022-05-13 PROCEDURE — 80053 COMPREHEN METABOLIC PANEL: CPT | Performed by: PATHOLOGY

## 2022-05-13 PROCEDURE — 82728 ASSAY OF FERRITIN: CPT | Performed by: PATHOLOGY

## 2022-05-13 PROCEDURE — 86803 HEPATITIS C AB TEST: CPT | Mod: 90 | Performed by: PATHOLOGY

## 2022-05-13 PROCEDURE — 99000 SPECIMEN HANDLING OFFICE-LAB: CPT | Performed by: PATHOLOGY

## 2022-05-13 NOTE — PATIENT INSTRUCTIONS
Go the lab today to have your blood arnol and to leave a urine sample. I will notify you with results by phone and/or MyChart if you have an account set up.     PRUDENCE Villatoro CNP

## 2022-05-13 NOTE — PROGRESS NOTES
Jaxon Yeung is a 21 year old male who presents today for yumi urine and abdominal pain. Patient has a h/o autism, and per self has one kidney.     Abdominal pain   O:2-4 days ago   L:abdomen   D:daily, internittent ( gone now)    C:0/10 now   A:none   R:joana-seltzer   T:joana-seltzer  ( says that 20-30 min later he felt much better, but he did not like the taste of it)   Patient says that he did have one episode of vomiting, and that made him feel better. Denies constipation or diarrhea.     Dark Urine  O:2 days ago   L: N/A  D: 2 days   C: dark colored urine   A: decreased PO intake due to abdominal pain   R:drinking more fluids   T: increased fluid intake.       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  Social History     Socioeconomic History     Marital status: Single     Spouse name: Not on file     Number of children: Not on file     Years of education: Not on file     Highest education level: Not on file   Occupational History     Not on file   Tobacco Use     Smoking status: Never Smoker     Smokeless tobacco: Never Used   Vaping Use     Vaping Use: Never used   Substance and Sexual Activity     Alcohol use: Never     Drug use: Never     Sexual activity: Never   Other Topics Concern     Not on file   Social History Narrative     Not on file     Social Determinants of Health     Financial Resource Strain: Not on file  "  Food Insecurity: Not on file   Transportation Needs: Not on file   Physical Activity: Not on file   Stress: Not on file   Social Connections: Not on file   Intimate Partner Violence: Not on file   Housing Stability: Not on file     Family History   Problem Relation Age of Onset     Coronary Artery Disease Mother      Asthma Mother      Cerebrovascular Disease Father      Prostate Cancer Father        /80 (BP Location: Right arm, Patient Position: Sitting, Cuff Size: Adult Large)   Pulse 85   Temp 98.4  F (36.9  C) (Oral)   Ht 1.702 m (5' 7\")   Wt 115.6 kg (254 lb 12.8 oz)   SpO2 98%   BMI 39.91 kg/m      Exam:  Constitutional: healthy, alert and no distress  Head: Normocephalic. No masses, lesions, tenderness or abnormalities  Cardiovascular: negative, PMI normal. No lifts, heaves, or thrills. RRR. No murmurs, clicks gallops or rub  Respiratory: negative  Gastrointestinal: Abdomen soft, non-tender. BS normal. No masses, organomegaly  : Deferred  Musculoskeletal: extremities normal- no gross deformities noted, gait normal and normal muscle tone  Psychiatric: mentation appears normal and affect flat      Assessment/Plan:  (R82.998) Dark urine  (primary encounter diagnosis)  Plan: UA with Micro reflex to Culture, Comprehensive metabolic panel            (R10.84) Abdominal pain, generalized  Plan: UA with Micro reflex to Culture, Comprehensive  metabolic panel         Patient states that his pain is gone and that color of urine has improved, but he would like tests to know that his kidney is Ok and that he is \"in good health\" Ordered labs above. Patient requests to be notified of results by phone at him listed home number. Ok to leave a detailed message if he does not answer.     Delfina Flynn, APRN CNP        "

## 2022-05-13 NOTE — TELEPHONE ENCOUNTER
Patient mother called to know the results and is very urgent for her to know as she worried about the results for her son. And would like to talk to the provider Delfina Flynn.

## 2022-05-13 NOTE — TELEPHONE ENCOUNTER
[facility] Call Center    Phone Message    May a detailed message be left on voicemail: yes     Reason for Call: Other: [employer] returned call from Delfina Rivas re: test results from today     Action Taken: Message routed to:  [facility] ([facility] NP Clinic    Travel Screening: Not Applicable

## 2022-05-13 NOTE — TELEPHONE ENCOUNTER
Called and spoke with patient to give lab results. Verified with patient that he does not drink alcohol or take tylenol. reviewed medication list again and it is correct.     Placed order for abdominal ultrasound and referral to GI. patient is aware. Recommended that patient stay hydrated with water and non caffeine fluids.  Avoid alcohol and tylenol.    PRUDENCE Villatoro CNP

## 2022-05-23 NOTE — TELEPHONE ENCOUNTER
RECORDS RECEIVED FROM: Internal   Appt Date: 06.06.2022   NOTES STATUS DETAILS   OFFICE NOTE from referring provider Internal 05.13.2022 Delfina Flynn, APRN CNP   OFFICE NOTES from other specialists     DISCHARGE SUMMARY from hospital     MEDICATION LIST Internal    LIVER BIOSPY (IF APPLICABLE)      PATHOLOGY REPORTS      IMAGING     ENDOSCOPY (IF AVAILABLE)     COLONOSCOPY (IF AVAILABLE)     ULTRASOUND LIVER     CT OF ABDOMEN     MRI OF LIVER     FIBROSCAN, US ELASTOGRAPHY, FIBROSIS SCAN, MR ELASTOGRAPHY     LABS     HEPATIC PANEL (LIVER PANEL) Internal 05.13.2022   BASIC METABOLIC PANEL Internal 05.13.2022   COMPLETE METABOLIC PANEL     COMPLETE BLOOD COUNT (CBC)     INTERNATIONAL NORMALIZED RATIO (INR)     HEPATITIS C ANTIBODY Internal 05.13.2022   HEPATITIS C VIRAL LOAD/PCR     HEPATITIS C GENOTYPE     HEPATITIS B SURFACE ANTIGEN Internal 05.13.2022   HEPATITIS B SURFACE ANTIBODY     HEPATITIS B DNA QUANT LEVEL     HEPATITIS B CORE ANTIBODY

## 2022-06-01 DIAGNOSIS — Z11.59 ENCOUNTER FOR SCREENING FOR OTHER VIRAL DISEASES: ICD-10-CM

## 2022-06-01 DIAGNOSIS — R79.89 ELEVATED LFTS: Primary | ICD-10-CM

## 2022-06-06 ENCOUNTER — PRE VISIT (OUTPATIENT)
Dept: GASTROENTEROLOGY | Facility: CLINIC | Age: 22
End: 2022-06-06
Payer: COMMERCIAL

## 2022-06-08 ENCOUNTER — VIRTUAL VISIT (OUTPATIENT)
Dept: GASTROENTEROLOGY | Facility: CLINIC | Age: 22
End: 2022-06-08
Payer: COMMERCIAL

## 2022-06-08 VITALS — BODY MASS INDEX: 40.1 KG/M2 | WEIGHT: 256 LBS

## 2022-06-08 DIAGNOSIS — R74.8 ELEVATED LIVER ENZYMES: Primary | ICD-10-CM

## 2022-06-08 PROCEDURE — 97802 MEDICAL NUTRITION INDIV IN: CPT | Mod: TEL | Performed by: DIETITIAN, REGISTERED

## 2022-06-08 ASSESSMENT — PAIN SCALES - GENERAL: PAINLEVEL: NO PAIN (0)

## 2022-06-08 NOTE — PROGRESS NOTES
"Jaxon Yeung is a 22 year old male who is being evaluated via a billable telephone visit.     The patient has been notified of following:     \"This telephone visit will be conducted via a call between you and your physician/provider. We have found that certain health care needs can be provided without the need for a physical exam.  This service lets us provide the care you need with a short phone conversation.  If a prescription is necessary we can send it directly to your pharmacy.  If lab work is needed we can place an order for that and you can then stop by our lab to have the test done at a later time.    Telephone visits are billed at different rates depending on your insurance coverage. During this emergency period, for some insurers they may be billed the same as an in-person visit.  Please reach out to your insurance provider with any questions.    If during the course of the call the physician/provider feels a telephone visit is not appropriate, you will not be charged for this service.\"    Patient has given verbal consent for Telephone visit?  Yes    How would you like to obtain your AVS? Mail a copy    Phone call duration: 63 minutes    During this virtual visit the patient is located in [place], patient verifies this as the location during the entirety of this visit.       [facility] Outpatient Medical Nutrition Therapy      Time Spent:  63 minutes  Session Type:  Initial   Referring Physician:  Referred Self   Reason for RD Visit:   Elevated liver enzymes, nutritional counseling     Nutrition Assessment:  Patient is a 22 year old male with history that includes recent elevated liver enzymes, autism, cerebral palsy, congenital single kidney and asthma. He stated that he would like to get back to eating better and lose weight. He wonders if his eating is contributing to his abnormal labs. He hasn't been eating well lately and used to exercise more but less lately due to less motivation " "to do so. He reported that he eats more than he \"should\". He's trying to limit his sugar intake and eat less candy because it's \"taking a toll on\" him and he gets stomach discomfort.  For example he bought donuts and had 3 of them and stomach hurt afterwards. He used to drink a lot of soda but stopped about 3-4 months ago, and now he can't drink a lot of soda. Does not eat a lot of vegetable. He's trying to limit his snacking and trying to stop eating candy. He reported eating quickly as well. In 2015, he lost 36 lbs and got down to about ~175-8 lbs. He used to meet with a nutritionist which helped him work on losing weight and at that time he was advised to stop drinking soda and lisandra aid. He stated that at that time, he was more active and used to play some sports with friends but now not going out much and not very active. He has some weights at home as well but not using them.     Patient Active Problem List   Diagnosis     Closed spondylolysis fracture of lumbar vertebra     Spondylolisthesis of lumbar region     Morbid obesity (H)     Height:   Ht Readings from Last 1 Encounters:   05/13/22 1.702 m (5' 7\")     Weight:  Wt Readings from Last 10 Encounters:   06/08/22 116.1 kg (256 lb)   05/13/22 115.6 kg (254 lb 12.8 oz)   02/25/22 120.2 kg (265 lb)   02/16/22 120.3 kg (265 lb 3.2 oz)   07/09/19 120.1 kg (264 lb 11.2 oz)   07/05/19 122.6 kg (270 lb 6 oz)   07/30/15 84.6 kg (186 lb 9.6 oz) (92 %, Z= 1.40)*   06/18/15 86.9 kg (191 lb 9.6 oz) (94 %, Z= 1.54)*   04/30/15 87.6 kg (193 lb 3.2 oz) (95 %, Z= 1.60)*   04/23/15 85.9 kg (189 lb 6.4 oz) (94 %, Z= 1.52)*     * Growth percentiles are based on CDC (Boys, 2-20 Years) data.      BMI: Estimated body mass index is 40.1 kg/m  as calculated from the following:    Height as of 5/13/22: 1.702 m (5' 7\").    Weight as of this encounter: 116.1 kg (256 lb).    Diet Recall:  (some usual/recent meals/snacks/beverages):   Meal Food    Breakfast Today: hash browns and egg or " 1/2-1 full bowl (med-lg bowl) cereal (recently restarted eating some special K but recent was eating a sweetened chocolately cereal) with milk   Lunch Yesterday: PBJ sandwich on wheat bread or Sometimes leftovers   Dinner Mom makes dinner: recently had hamburger helper with green beans OR other beef/chicken/fish with noodles/rice/potatoes   Snacks Donuts, chips, candy (reeses PB cups, candy bars, chocolate)   Beverages 3 bottles (16.9 oz x 3) Water, 1 glass 1% milk. Up until about 3-4 months ago was drinking a lot of soda but now discontinued.   Alcohol Intake none     Frequency of eating/taking out meals: beginning of month went to [employer] but now trying to decrease. Recently had been getting fast food several times per week.     Labs:  On 5/13/22: total bilirubin 3.9,  and .  Last Comprehensive Metabolic Panel:  Sodium   Date Value Ref Range Status   05/13/2022 140 133 - 144 mmol/L Final     Potassium   Date Value Ref Range Status   05/13/2022 3.7 3.4 - 5.3 mmol/L Final     Chloride   Date Value Ref Range Status   05/13/2022 103 94 - 109 mmol/L Final     Carbon Dioxide (CO2)   Date Value Ref Range Status   05/13/2022 30 20 - 32 mmol/L Final     Anion Gap   Date Value Ref Range Status   05/13/2022 7 3 - 14 mmol/L Final     Glucose   Date Value Ref Range Status   05/13/2022 99 70 - 99 mg/dL Final     Urea Nitrogen   Date Value Ref Range Status   05/13/2022 11 7 - 30 mg/dL Final     Creatinine   Date Value Ref Range Status   05/13/2022 1.03 0.66 - 1.25 mg/dL Final     GFR Estimate   Date Value Ref Range Status   05/13/2022 >90 >60 mL/min/1.73m2 Final     Comment:     Effective December 21, 2021 eGFRcr in adults is calculated using the 2021 CKD-EPI creatinine equation which includes age and gender (Kevin rios al., NEJM, DOI: 10.1056/QBKLcs8097830)     Calcium   Date Value Ref Range Status   05/13/2022 9.1 8.5 - 10.1 mg/dL Final     CBC RESULTS:   Recent Labs   Lab Test 02/25/22  1130   HGB 16.8  "    Pertinent Medications/vitamin and mineral supplements:     Current Outpatient Medications   Medication     albuterol (2.5 MG/3ML) 0.083% nebulizer solution     albuterol (PROAIR HFA/PROVENTIL HFA/VENTOLIN HFA) 108 (90 Base) MCG/ACT inhaler     loratadine (CLARITIN) 10 MG tablet     VITAMIN D, CHOLECALCIFEROL, PO     No current facility-administered medications for this visit.     Food Allergies:  NKFA   Physical Activity:  Less exercise than in the past. Has weights at home. Reported that he is \"lazy\"    MALNUTRITION:  % Weight Loss:  None noted-working on intentional weight loss  % Intake:  No decreased intake noted  Subcutaneous Fat Loss:  Unable to assess  Muscle Loss:  Unable to assess  Fluid Retention:  None noted    Malnutrition Diagnosis: Patient does not meet two of the above criteria necessary for diagnosing malnutrition    Nutrition Prescription: Plate method    Nutrition Intervention      Provided diet education for general healthful diet to help with losing weight. Reviewed the plate method meal plan. Discussed tips for getting adequate fluids and continue to avoid soda, sweetened beverages/liquid kcals. Also recommended increasing activity/exercise as able. Recommended starting food and ewa journals to help him track daily intake and keep him on track. Recommended eating slowly and taking at least 20 minutes to eat meals. Patient verbalized understanding of education provided. See Goals below.     Educational Materials Provided:  FV Plate method, weight loss tips, journal sheet, protein sources, carbohydrate foods.    Goals:    1. Continue eating 3 meals per day.    2. Can use the Plate method plan for general guidance on getting balanced meals which is as follows. Here is the link to a different plate method we use in our weight management clinic:     --Use a smaller size plate not a large dinner plate.     --Make half of your plate non starchy vegetables (some examples include: broccoli, " cauliflower, asparagus, lettuce, tomato, brussels sprouts, peppers, cucumbers, cabbage, spinach, kale)     --Make 1/4 of your plate lean protein sources at a meal (fish, chicken/turkey, pork loin, lean cuts of beef, black or kidney or carmona beans, eggs, egg whites, lowfat cottage cheese, lowfat yogurt, tofu, edamame, tempeh).     --Make the other 1/4 of your plate healthy carbohydrate choices which includes whole grain starches, grains, starchy vegetables and fruit. Some examples include quinoa, brown rice, barley, wild rice, whole grain tortilla or starchy vegetables (potatoes, sweet potatoes, winter squash, peas, corn).     Include some healthy/unsaturated fat servings at a meal (avocados, olive oil, avocado oil, vegetable oils, flaxseed, sera seeds, nuts/nut butter, sunbutter).    3. Limit snacking in between. If you are truly hungry or if you have a long stretch between meals, then okay to have a healthy snack.   Here are some snack ideas we discussed:    --You could have a low calorie, high protein drinks such as a Premier Protein drink, Pure Protein, Muscle milk lite drink (they have chocolate flavor so could have this instead of a chocolate bar).    --a piece of fruit for a snack or some cut up vegetables (sliced cucumbers, carrots, sliced bell peppers).    --could have low sugar yogurt or some lowfat cottage cheese.    --Or could have about 10-15 nuts.    4. Eat slowly and take at least 20 minutes to eat a meals.    5. Increase exercise and aim for starting with at least 10-15 minutes or more per day.    6. Continue to drink at least 3-4 water bottles per day.    7. Can keep daily food and beverage journals and write down everything you eat and drink. You do not need to calculate calories, but just write down all meals, snacks and drink you have eat day.    Protein Sources for Weight Loss  [email or web address]     Carbohydrates  [email or web address]     Daily Food and Exercise  Log  [email or web address]     Mindful Eating  [email or web address]     Tips for Weight Loss and Weight Management  [email or web address]    Create a Plate (How to Build a Healthy Meal)  [email or web address]     Nutrition Monitoring and Evaluation: Will monitor adherence to nutrition recommendations at future RD visits.     Further Medical Nutrition Therapy:  Follow-up July 13, 2022 at 1:30 PM    Patient was encouraged to call/contact RD with any further questions.    Sadie Brody, MS, RD, LD

## 2022-06-08 NOTE — PATIENT INSTRUCTIONS
It was nice speaking with you today. Below are the nutrition recommendations we discussed at your visit.    Please let me know if you have any additional questions.    Nutrition Recommendations    1. Continue eating 3 meals per day.    2. Can use the Plate method plan for general guidance on getting balanced meals which is as follows. Here is the link to a different plate method we use in our weight management clinic:     --Use a smaller size plate not a large dinner plate.     --Make half of your plate non starchy vegetables (some examples include: broccoli, cauliflower, asparagus, lettuce, tomato, brussels sprouts, peppers, cucumbers, cabbage, spinach, kale)     --Make 1/4 of your plate lean protein sources at a meal (fish, chicken/turkey, pork loin, lean cuts of beef, black or kidney or carmona beans, eggs, egg whites, lowfat cottage cheese, lowfat yogurt, tofu, edamame, tempeh).     --Make the other 1/4 of your plate healthy carbohydrate choices which includes whole grain starches, grains, starchy vegetables and fruit. Some examples include quinoa, brown rice, barley, wild rice, whole grain tortilla or starchy vegetables (potatoes, sweet potatoes, winter squash, peas, corn).     Include some healthy/unsaturated fat servings at a meal (avocados, olive oil, avocado oil, vegetable oils, flaxseed, sera seeds, nuts/nut butter, sunbutter).    3. Limit snacking in between. If you are truly hungry or if you have a long stretch between meals, then okay to have a healthy snack.   Here are some snack ideas we discussed:    --You could have a low calorie, high protein drinks such as a Premier Protein drink, Pure Protein, Muscle milk lite drink (they have chocolate flavor so could have this instead of a chocolate bar).    --a piece of fruit for a snack or some cut up vegetables (sliced cucumbers, carrots, sliced bell peppers).    --could have low sugar yogurt or some lowfat cottage cheese.    --Or could have about 10-15  nuts.    4. Eat slowly and take at least 20 minutes to eat a meals.    5. Increase exercise and aim for starting with at least 10-15 minutes or more per day.    6. Continue to drink at least 3-4 water bottles per day.    7. Can keep daily food and beverage journals and write down everything you eat and drink. You do not need to calculate calories, but just write down all meals, snacks and drink you have eat day.    Your follow up appointment is scheduled for July 13, 2022 at 1:30 PM. If you need to make any changes to your appointment, please call [phone].    Thank you,    Sadie Brody, MS, RD, LD    Protein Sources for Weight Loss  [email or web address]     Carbohydrates  [email or web address]     Daily Food and Exercise Log  [email or web address]     Mindful Eating  [email or web address]     Tips for Weight Loss and Weight Management  [email or web address]    Create a Plate (How to Build a Healthy Meal)  [email or web address]

## 2022-06-08 NOTE — LETTER
"    6/8/2022         RE: Jaxon Yeung  [address]  [facility] [address]        Dear Colleague,    Thank you for referring your patient, Jaxon Yeung, to the [facility] [place]. Please see a copy of my visit note below.      [facility] Outpatient Medical Nutrition Therapy      Time Spent:  63 minutes  Session Type:  Initial   Referring Physician:  Referred Self   Reason for RD Visit:   Elevated liver enzymes, nutritional counseling     Nutrition Assessment:  Patient is a 22 year old male with history that includes recent elevated liver enzymes, autism, cerebral palsy, congenital single kidney and asthma. He stated that he would like to get back to eating better and lose weight. He wonders if his eating is contributing to his abnormal labs. He hasn't been eating well lately and used to exercise more but less lately due to less motivation to do so. He reported that he eats more than he \"should\". He's trying to limit his sugar intake and eat less candy because it's \"taking a toll on\" him and he gets stomach discomfort.  For example he bought donuts and had 3 of them and stomach hurt afterwards. He used to drink a lot of soda but stopped about 3-4 months ago, and now he can't drink a lot of soda. Does not eat a lot of vegetable. He's trying to limit his snacking and trying to stop eating candy. He reported eating quickly as well. In 2015, he lost 36 lbs and got down to about ~175-8 lbs. He used to meet with a nutritionist which helped him work on losing weight and at that time he was advised to stop drinking soda and lisandra aid. He stated that at that time, he was more active and used to play some sports with friends but now not going out much and not very active. He has some weights at home as well but not using them.     Patient Active Problem List   Diagnosis     Closed spondylolysis fracture of lumbar vertebra     Spondylolisthesis of " "lumbar region     Morbid obesity (H)     Height:   Ht Readings from Last 1 Encounters:   05/13/22 1.702 m (5' 7\")     Weight:  Wt Readings from Last 10 Encounters:   06/08/22 116.1 kg (256 lb)   05/13/22 115.6 kg (254 lb 12.8 oz)   02/25/22 120.2 kg (265 lb)   02/16/22 120.3 kg (265 lb 3.2 oz)   07/09/19 120.1 kg (264 lb 11.2 oz)   07/05/19 122.6 kg (270 lb 6 oz)   07/30/15 84.6 kg (186 lb 9.6 oz) (92 %, Z= 1.40)*   06/18/15 86.9 kg (191 lb 9.6 oz) (94 %, Z= 1.54)*   04/30/15 87.6 kg (193 lb 3.2 oz) (95 %, Z= 1.60)*   04/23/15 85.9 kg (189 lb 6.4 oz) (94 %, Z= 1.52)*     * Growth percentiles are based on CDC (Boys, 2-20 Years) data.      BMI: Estimated body mass index is 40.1 kg/m  as calculated from the following:    Height as of 5/13/22: 1.702 m (5' 7\").    Weight as of this encounter: 116.1 kg (256 lb).    Diet Recall:  (some usual/recent meals/snacks/beverages):   Meal Food    Breakfast Today: hash browns and egg or 1/2-1 full bowl (med-lg bowl) cereal (recently restarted eating some special K but recent was eating a sweetened chocolately cereal) with milk   Lunch Yesterday: PBJ sandwich on wheat bread or Sometimes leftovers   Dinner Mom makes dinner: recently had hamburger helper with green beans OR other beef/chicken/fish with noodles/rice/potatoes   Snacks Donuts, chips, candy (reeses PB cups, candy bars, chocolate)   Beverages 3 bottles (16.9 oz x 3) Water, 1 glass 1% milk. Up until about 3-4 months ago was drinking a lot of soda but now discontinued.   Alcohol Intake none     Frequency of eating/taking out meals: beginning of month went to [employer] but now trying to decrease. Recently had been getting fast food several times per week.     Labs:  On 5/13/22: total bilirubin 3.9,  and .  Last Comprehensive Metabolic Panel:  Sodium   Date Value Ref Range Status   05/13/2022 140 133 - 144 mmol/L Final     Potassium   Date Value Ref Range Status   05/13/2022 3.7 3.4 - 5.3 mmol/L Final     Chloride " "  Date Value Ref Range Status   05/13/2022 103 94 - 109 mmol/L Final     Carbon Dioxide (CO2)   Date Value Ref Range Status   05/13/2022 30 20 - 32 mmol/L Final     Anion Gap   Date Value Ref Range Status   05/13/2022 7 3 - 14 mmol/L Final     Glucose   Date Value Ref Range Status   05/13/2022 99 70 - 99 mg/dL Final     Urea Nitrogen   Date Value Ref Range Status   05/13/2022 11 7 - 30 mg/dL Final     Creatinine   Date Value Ref Range Status   05/13/2022 1.03 0.66 - 1.25 mg/dL Final     GFR Estimate   Date Value Ref Range Status   05/13/2022 >90 >60 mL/min/1.73m2 Final     Comment:     Effective December 21, 2021 eGFRcr in adults is calculated using the 2021 CKD-EPI creatinine equation which includes age and gender (Kevin rios al., [place], DOI: 10.1056/AWYPbp2836319)     Calcium   Date Value Ref Range Status   05/13/2022 9.1 8.5 - 10.1 mg/dL Final     CBC RESULTS:   Recent Labs   Lab Test 02/25/22  1130   HGB 16.8     Pertinent Medications/vitamin and mineral supplements:     Current Outpatient Medications   Medication     albuterol (2.5 MG/3ML) 0.083% nebulizer solution     albuterol (PROAIR HFA/PROVENTIL HFA/VENTOLIN HFA) 108 (90 Base) MCG/ACT inhaler     loratadine (CLARITIN) 10 MG tablet     VITAMIN D, CHOLECALCIFEROL, PO     No current facility-administered medications for this visit.     Food Allergies:  NKFA   Physical Activity:  Less exercise than in the past. Has weights at home. Reported that he is \"lazy\"    MALNUTRITION:  % Weight Loss:  None noted-working on intentional weight loss  % Intake:  No decreased intake noted  Subcutaneous Fat Loss:  Unable to assess  Muscle Loss:  Unable to assess  Fluid Retention:  None noted    Malnutrition Diagnosis: Patient does not meet two of the above criteria necessary for diagnosing malnutrition    Nutrition Prescription: Plate method    Nutrition Intervention      Provided diet education for general healthful diet to help with losing weight. Reviewed the plate method " meal plan. Discussed tips for getting adequate fluids and continue to avoid soda, sweetened beverages/liquid kcals. Also recommended increasing activity/exercise as able. Recommended starting food and ewa journals to help him track daily intake and keep him on track. Recommended eating slowly and taking at least 20 minutes to eat meals. Patient verbalized understanding of education provided. See Goals below.     Educational Materials Provided:  FV Plate method, weight loss tips, journal sheet, protein sources, carbohydrate foods.    Goals:    1. Continue eating 3 meals per day.    2. Can use the Plate method plan for general guidance on getting balanced meals which is as follows. Here is the link to a different plate method we use in our weight management clinic:     --Use a smaller size plate not a large dinner plate.     --Make half of your plate non starchy vegetables (some examples include: broccoli, cauliflower, asparagus, lettuce, tomato, brussels sprouts, peppers, cucumbers, cabbage, spinach, kale)     --Make 1/4 of your plate lean protein sources at a meal (fish, chicken/turkey, pork loin, lean cuts of beef, black or kidney or carmona beans, eggs, egg whites, lowfat cottage cheese, lowfat yogurt, tofu, edamame, tempeh).     --Make the other 1/4 of your plate healthy carbohydrate choices which includes whole grain starches, grains, starchy vegetables and fruit. Some examples include quinoa, brown rice, barley, wild rice, whole grain tortilla or starchy vegetables (potatoes, sweet potatoes, winter squash, peas, corn).     Include some healthy/unsaturated fat servings at a meal (avocados, olive oil, avocado oil, vegetable oils, flaxseed, sera seeds, nuts/nut butter, sunbutter).    3. Limit snacking in between. If you are truly hungry or if you have a long stretch between meals, then okay to have a healthy snack.   Here are some snack ideas we discussed:    --You could have a low calorie, high protein drinks such  as a Premier Protein drink, Pure Protein, Muscle milk lite drink (they have chocolate flavor so could have this instead of a chocolate bar).    --a piece of fruit for a snack or some cut up vegetables (sliced cucumbers, carrots, sliced bell peppers).    --could have low sugar yogurt or some lowfat cottage cheese.    --Or could have about 10-15 nuts.    4. Eat slowly and take at least 20 minutes to eat a meals.    5. Increase exercise and aim for starting with at least 10-15 minutes or more per day.    6. Continue to drink at least 3-4 water bottles per day.    7. Can keep daily food and beverage journals and write down everything you eat and drink. You do not need to calculate calories, but just write down all meals, snacks and drink you have eat day.    Protein Sources for Weight Loss  [email or web address]     Carbohydrates  [email or web address]     Daily Food and Exercise Log  [email or web address]     Mindful Eating  [email or web address]     Tips for Weight Loss and Weight Management  [email or web address]    Create a Plate (How to Build a Healthy Meal)  [email or web address]     Nutrition Monitoring and Evaluation: Will monitor adherence to nutrition recommendations at future RD visits.     Further Medical Nutrition Therapy:  Follow-up July 13, 2022 at 1:30 PM    Patient was encouraged to call/contact RD with any further questions.        Sadie Brody, MS, RD, LD

## 2022-06-23 ENCOUNTER — LAB (OUTPATIENT)
Dept: LAB | Facility: CLINIC | Age: 22
End: 2022-06-23
Payer: COMMERCIAL

## 2022-06-23 DIAGNOSIS — R79.89 ELEVATED LFTS: ICD-10-CM

## 2022-06-23 DIAGNOSIS — R74.8 ELEVATED LIVER ENZYMES: ICD-10-CM

## 2022-06-23 DIAGNOSIS — Z11.59 ENCOUNTER FOR SCREENING FOR OTHER VIRAL DISEASES: ICD-10-CM

## 2022-06-23 LAB
ALBUMIN SERPL-MCNC: 3.9 G/DL (ref 3.4–5)
ALP SERPL-CCNC: 79 U/L (ref 40–150)
ALT SERPL W P-5'-P-CCNC: 46 U/L (ref 0–70)
ANION GAP SERPL CALCULATED.3IONS-SCNC: 7 MMOL/L (ref 3–14)
AST SERPL W P-5'-P-CCNC: 23 U/L (ref 0–45)
BILIRUB DIRECT SERPL-MCNC: 0.3 MG/DL (ref 0–0.2)
BILIRUB SERPL-MCNC: 0.7 MG/DL (ref 0.2–1.3)
BUN SERPL-MCNC: 10 MG/DL (ref 7–30)
CALCIUM SERPL-MCNC: 9 MG/DL (ref 8.5–10.1)
CHLORIDE BLD-SCNC: 106 MMOL/L (ref 94–109)
CO2 SERPL-SCNC: 27 MMOL/L (ref 20–32)
CREAT SERPL-MCNC: 0.99 MG/DL (ref 0.66–1.25)
ERYTHROCYTE [DISTWIDTH] IN BLOOD BY AUTOMATED COUNT: 14 % (ref 10–15)
GFR SERPL CREATININE-BSD FRML MDRD: >90 ML/MIN/1.73M2
GLUCOSE BLD-MCNC: 91 MG/DL (ref 70–99)
HBV CORE AB SERPL QL IA: NONREACTIVE
HBV SURFACE AB SERPL IA-ACNC: 3.66 M[IU]/ML
HCT VFR BLD AUTO: 48.2 % (ref 40–53)
HGB BLD-MCNC: 16 G/DL (ref 13.3–17.7)
INR PPP: 1 (ref 0.85–1.15)
MCH RBC QN AUTO: 27.4 PG (ref 26.5–33)
MCHC RBC AUTO-ENTMCNC: 33.2 G/DL (ref 31.5–36.5)
MCV RBC AUTO: 83 FL (ref 78–100)
PLATELET # BLD AUTO: 235 10E3/UL (ref 150–450)
POTASSIUM BLD-SCNC: 3.8 MMOL/L (ref 3.4–5.3)
PROT SERPL-MCNC: 7.5 G/DL (ref 6.8–8.8)
RBC # BLD AUTO: 5.83 10E6/UL (ref 4.4–5.9)
SODIUM SERPL-SCNC: 140 MMOL/L (ref 133–144)
WBC # BLD AUTO: 8.7 10E3/UL (ref 4–11)

## 2022-06-23 PROCEDURE — 82525 ASSAY OF COPPER: CPT | Mod: 90 | Performed by: PATHOLOGY

## 2022-06-23 PROCEDURE — 85027 COMPLETE CBC AUTOMATED: CPT | Performed by: PATHOLOGY

## 2022-06-23 PROCEDURE — 36415 COLL VENOUS BLD VENIPUNCTURE: CPT | Performed by: PATHOLOGY

## 2022-06-23 PROCEDURE — 85610 PROTHROMBIN TIME: CPT | Performed by: PATHOLOGY

## 2022-06-23 PROCEDURE — 86706 HEP B SURFACE ANTIBODY: CPT | Mod: 90 | Performed by: PATHOLOGY

## 2022-06-23 PROCEDURE — 86704 HEP B CORE ANTIBODY TOTAL: CPT | Mod: 90 | Performed by: PATHOLOGY

## 2022-06-23 PROCEDURE — 82248 BILIRUBIN DIRECT: CPT | Performed by: PATHOLOGY

## 2022-06-23 PROCEDURE — 80053 COMPREHEN METABOLIC PANEL: CPT | Performed by: PATHOLOGY

## 2022-06-23 PROCEDURE — 99000 SPECIMEN HANDLING OFFICE-LAB: CPT | Performed by: PATHOLOGY

## 2022-06-25 LAB — COPPER SERPL-MCNC: 115.9 UG/DL

## 2022-07-13 ENCOUNTER — VIRTUAL VISIT (OUTPATIENT)
Dept: GASTROENTEROLOGY | Facility: CLINIC | Age: 22
End: 2022-07-13
Payer: COMMERCIAL

## 2022-07-13 DIAGNOSIS — R74.8 ELEVATED LIVER ENZYMES: Primary | ICD-10-CM

## 2022-07-13 PROCEDURE — 97803 MED NUTRITION INDIV SUBSEQ: CPT | Mod: GT | Performed by: DIETITIAN, REGISTERED

## 2022-07-13 NOTE — PROGRESS NOTES
"Jaxon is a 22 year old who is being evaluated via a billable video visit.      How would you like to obtain your AVS? MyChart  If the video visit is dropped, the invitation should be resent by: Text to cell phone: [phone]  Will anyone else be joining your video visit? No    Video-Visit Details    Video Start Time: 1:31 PM    Type of service:  Video Visit    Video End Time:2:20 PM    Originating Location (pt. Location): Home    Distant Location (provider location):  [facility] [place]     Platform used for Video Visit: [employer]     [facility] Outpatient Medical Nutrition Therapy      Time Spent:  49 minutes  Session Type:  Follow up  Referring Physician:  Referred Self   Reason for RD Visit:   Elevated liver enzymes, nutritional counseling     Nutrition Assessment:  Patient is a 22 year old male with history that includes recent elevated liver enzymes, autism, cerebral palsy, congenital single kidney and asthma.     At initial visit, he stated that he would like to get back to eating better and lose weight. He wonders if his eating is contributing to his abnormal labs. He hasn't been eating well lately and used to exercise more but less lately due to less motivation to do so. He reported that he eats more than he \"should\". He's trying to limit his sugar intake and eat less candy because it's \"taking a toll on\" him and he gets stomach discomfort.  For example he bought donuts and had 3 of them and stomach hurt afterwards. He used to drink a lot of soda but stopped about 3-4 months ago, and now he can't drink a lot of soda. Does not eat a lot of vegetable. He's trying to limit his snacking and trying to stop eating candy. He reported eating quickly as well. In 2015, he lost 36 lbs and got down to about ~175-8 lbs. He used to meet with a nutritionist which helped him work on losing weight and at that time he was advised to stop drinking soda and lisandra aid. He stated that at " that time, he was more active and used to play some sports with friends but now not going out much and not very active. He has some weights at home as well but not using them.     At follow up visit today 7/13/22: he reported that he is trying to eat more slowly, still working on this but has been much more careful and conscious about what he is eating and has gotten a little better at slowing down. He completely discontinued eating candy altogether. He thought it would be impossible for him to discontinue candy altogether so he is very happy with his results. It was difficult for a couple of weeks at first but now much more comfortable and happy with his changes made. He had an issue with a very upset stomach, so it was a reminder to him that his health is important and stopped eating candy. He also continues to avoid soda since giving it up several months ago. He switched to eating unsweetened cereal now. He used to eat reeses puff/chocolate cereal but now just plain special K with sliced banana in it. He is still eating some chips but now less and watching his portion. He will pour a small amount into a bowl and that is all he will eat. He weighed himself at visit today on home scale and was 240 lbs. He also reported that he had a size 36 jeans that he previously could not fit into but now can. He also spoke with his worker who he meets with on Fridays and they have a plan to exercise on Fridays-either walking of going to the gym. He also now has a bus card, so is able to get out more as well. He recently met up with a friend and walk around the [place] for a couple of hours. Overall he is happy with the changes he made and he would like to continue sticking to this plan and stay on track.    Patient Active Problem List   Diagnosis     Closed spondylolysis fracture of lumbar vertebra     Spondylolisthesis of lumbar region     Morbid obesity (H)     Height:   Ht Readings from Last 1 Encounters:   05/13/22  "1.702 m (5' 7\")     Weight: He obtained weight on home scale today and was 240 lbs. Down 16 lbs.  Wt Readings from Last 10 Encounters:   06/08/22 116.1 kg (256 lb)   05/13/22 115.6 kg (254 lb 12.8 oz)   02/25/22 120.2 kg (265 lb)   02/16/22 120.3 kg (265 lb 3.2 oz)   07/09/19 120.1 kg (264 lb 11.2 oz)   07/05/19 122.6 kg (270 lb 6 oz)   07/30/15 84.6 kg (186 lb 9.6 oz) (92 %, Z= 1.40)*   06/18/15 86.9 kg (191 lb 9.6 oz) (94 %, Z= 1.54)*   04/30/15 87.6 kg (193 lb 3.2 oz) (95 %, Z= 1.60)*   04/23/15 85.9 kg (189 lb 6.4 oz) (94 %, Z= 1.52)*     * Growth percentiles are based on CDC (Boys, 2-20 Years) data.      BMI: Estimated body mass index is 40.1 kg/m  as calculated from the following:    Height as of 5/13/22: 1.702 m (5' 7\").    Weight as of 6/8/22: 116.1 kg (256 lb).    Diet Recall:  (some usual/recent meals/snacks/beverages):   Meal Food    Breakfast Today: scrambled eggs with cheese and hash brown or spec K cereal with sliced banana and milk.     Lunch Chicken sandwich on wheat bread with small amt of chips   Dinner Mom usually makes dinner. Last night: cheese pizza and a little later 1/2 can ravioli or hamburger helper with green beans OR other beef/chicken/fish with noodles/rice/potatoes, trying to consistently get a veg too   Snacks If hungry, then PBJ sandwich on wheat bread and glass of milk or may have small bowl chips or none   Beverages 1-3 bottles (16.9 oz) Water, 1-2 x 12 oz gatorade zero, sometimes 1 glass 1% milk in evening. Up until about 4 months ago was drinking a lot of soda but now discontinued.   Alcohol Intake none     Frequency of eating/taking out meals: beginning of month went to [employer] but now trying to decrease. Recently had been getting fast food several times per week.     Labs:  On 5/13/22: total bilirubin 3.9,  and .  Last Comprehensive Metabolic Panel:  Sodium   Date Value Ref Range Status   06/23/2022 140 133 - 144 mmol/L Final     Potassium   Date Value Ref Range " "Status   06/23/2022 3.8 3.4 - 5.3 mmol/L Final     Chloride   Date Value Ref Range Status   06/23/2022 106 94 - 109 mmol/L Final     Carbon Dioxide (CO2)   Date Value Ref Range Status   06/23/2022 27 20 - 32 mmol/L Final     Anion Gap   Date Value Ref Range Status   06/23/2022 7 3 - 14 mmol/L Final     Glucose   Date Value Ref Range Status   06/23/2022 91 70 - 99 mg/dL Final     Urea Nitrogen   Date Value Ref Range Status   06/23/2022 10 7 - 30 mg/dL Final     Creatinine   Date Value Ref Range Status   06/23/2022 0.99 0.66 - 1.25 mg/dL Final     GFR Estimate   Date Value Ref Range Status   06/23/2022 >90 >60 mL/min/1.73m2 Final     Comment:     Effective December 21, 2021 eGFRcr in adults is calculated using the 2021 CKD-EPI creatinine equation which includes age and gender (Kevin et al., [place], DOI: 10.1056/RSCKhl3987614)     Calcium   Date Value Ref Range Status   06/23/2022 9.0 8.5 - 10.1 mg/dL Final     CBC RESULTS:   Recent Labs   Lab Test 02/25/22  1130   HGB 16.8     Pertinent Medications/vitamin and mineral supplements:     Current Outpatient Medications   Medication     albuterol (2.5 MG/3ML) 0.083% nebulizer solution     albuterol (PROAIR HFA/PROVENTIL HFA/VENTOLIN HFA) 108 (90 Base) MCG/ACT inhaler     loratadine (CLARITIN) 10 MG tablet     VITAMIN D, CHOLECALCIFEROL, PO     No current facility-administered medications for this visit.     Food Allergies:  NKFA   Physical Activity:  Less exercise than in the past. Has weights at home. Reported that he is \"lazy\"    MALNUTRITION:  % Weight Loss:  working on intentional weight loss  % Intake:  No decreased intake noted-intentional decreased intake to work on losing weight.  Subcutaneous Fat Loss:  Unable to assess  Muscle Loss:  Unable to assess  Fluid Retention:  None noted    Malnutrition Diagnosis: Patient does not meet two of the above criteria necessary for diagnosing malnutrition    Nutrition Prescription: Plate method    Nutrition Intervention    "   Provided diet education review for general healthful diet to help with losing weight. Discussed some specific tips and ideas today based on his usual intake and food preferences.    Goals:    1. Continue eating 3 meals per day and can use the Plate method plan we discussed at last visit for guidance on getting balanced meals.    2. Consistently include some vegetables with your lunch and dinner meals and continue having some fruit such as banana or berries or other fruit with your breakfast meal.    Here are some ideas we discussed for eating more vegetables at meals:    --Can add some lettuce and tomatoes on your chicken or other meat sandwiches.    --Can have some sliced vegetables such as baby carrots, sliced bell peppers, sliced cucumbers or grape tomatoes on the side at lunch time or as a snack in hungry in between meals.    --Consistently eat some vegetables along with/at your dinner meal.    3. Continue to limit snacking in between. If you are truly hungry or if you have a long stretch between meals, then okay to have a healthy snack such as your peanut butter and jelly sandwich on wheat bread, fruit, vegetables, or lowfat cottage cheese or 10-15 nuts. If having chips, then limit to the one small bowl as you are currently doing.    4. Eat slowly and take at least 20 minutes to eat a meal.    5. Increase exercise and aim for starting with exercising at least once per week to start and then increase from there.    6. Drink at least 3-4 of your 16 ounce water bottles per day. (okay to have some gatorade zero, but limit to the 12 oz-16 oz bottle per day, since it has some sodium in it).    Nutrition Monitoring and Evaluation: Will monitor adherence to nutrition recommendations at future RD visits.     Further Medical Nutrition Therapy:  Follow-up August 17, 2022 at 1:30 PM    Patient was encouraged to call/contact RD with any further questions.    Sadie Brody, MS, RD, LD

## 2022-07-13 NOTE — LETTER
"  7/13/2022     RE: Jaxon Yeung  [address]  [facility] [address]    Dear Colleague,    Thank you for referring your patient, Jaxon Yeung, to the [facility] [place]. Please see a copy of my visit note below.    Jaxon is a 22 year old who is being evaluated via a billable video visit.      How would you like to obtain your AVS? MyChart  If the video visit is dropped, the invitation should be resent by: Text to cell phone: [phone]  Will anyone else be joining your video visit? No    Video-Visit Details    Video Start Time: 1:31 PM    Type of service:  Video Visit    Video End Time:2:20 PM    Originating Location (pt. Location): Home    Distant Location (provider location):  [facility] [place]     Platform used for Video Visit: Helio[employer]olt     [facility] Outpatient Medical Nutrition Therapy      Time Spent:  49 minutes  Session Type:  Follow up  Referring Physician:  Referred Self   Reason for RD Visit:   Elevated liver enzymes, nutritional counseling     Nutrition Assessment:  Patient is a 22 year old male with history that includes recent elevated liver enzymes, autism, cerebral palsy, congenital single kidney and asthma.     At initial visit, he stated that he would like to get back to eating better and lose weight. He wonders if his eating is contributing to his abnormal labs. He hasn't been eating well lately and used to exercise more but less lately due to less motivation to do so. He reported that he eats more than he \"should\". He's trying to limit his sugar intake and eat less candy because it's \"taking a toll on\" him and he gets stomach discomfort.  For example he bought donuts and had 3 of them and stomach hurt afterwards. He used to drink a lot of soda but stopped about 3-4 months ago, and now he can't drink a lot of soda. Does not eat a lot of vegetable. He's trying to limit his snacking " and trying to stop eating candy. He reported eating quickly as well. In 2015, he lost 36 lbs and got down to about ~175-8 lbs. He used to meet with a nutritionist which helped him work on losing weight and at that time he was advised to stop drinking soda and lisandra aid. He stated that at that time, he was more active and used to play some sports with friends but now not going out much and not very active. He has some weights at home as well but not using them.     At follow up visit today 7/13/22: he reported that he is trying to eat more slowly, still working on this but has been much more careful and conscious about what he is eating and has gotten a little better at slowing down. He completely discontinued eating candy altogether. He thought it would be impossible for him to discontinue candy altogether so he is very happy with his results. It was difficult for a couple of weeks at first but now much more comfortable and happy with his changes made. He had an issue with a very upset stomach, so it was a reminder to him that his health is important and stopped eating candy. He also continues to avoid soda since giving it up several months ago. He switched to eating unsweetened cereal now. He used to eat reeses puff/chocolate cereal but now just plain special K with sliced banana in it. He is still eating some chips but now less and watching his portion. He will pour a small amount into a bowl and that is all he will eat. He weighed himself at visit today on home scale and was 240 lbs. He also reported that he had a size 36 jeans that he previously could not fit into but now can. He also spoke with his worker who he meets with on Fridays and they have a plan to exercise on Fridays-either walking of going to the gym. He also now has a bus card, so is able to get out more as well. He recently met up with a friend and walk around the [employer] for a couple of hours. Overall he is happy with the changes he made  "and he would like to continue sticking to this plan and stay on track.    Patient Active Problem List   Diagnosis     Closed spondylolysis fracture of lumbar vertebra     Spondylolisthesis of lumbar region     Morbid obesity (H)     Height:   Ht Readings from Last 1 Encounters:   05/13/22 1.702 m (5' 7\")     Weight: He obtained weight on home scale today and was 240 lbs. Down 16 lbs.  Wt Readings from Last 10 Encounters:   06/08/22 116.1 kg (256 lb)   05/13/22 115.6 kg (254 lb 12.8 oz)   02/25/22 120.2 kg (265 lb)   02/16/22 120.3 kg (265 lb 3.2 oz)   07/09/19 120.1 kg (264 lb 11.2 oz)   07/05/19 122.6 kg (270 lb 6 oz)   07/30/15 84.6 kg (186 lb 9.6 oz) (92 %, Z= 1.40)*   06/18/15 86.9 kg (191 lb 9.6 oz) (94 %, Z= 1.54)*   04/30/15 87.6 kg (193 lb 3.2 oz) (95 %, Z= 1.60)*   04/23/15 85.9 kg (189 lb 6.4 oz) (94 %, Z= 1.52)*     * Growth percentiles are based on CDC (Boys, 2-20 Years) data.      BMI: Estimated body mass index is 40.1 kg/m  as calculated from the following:    Height as of 5/13/22: 1.702 m (5' 7\").    Weight as of 6/8/22: 116.1 kg (256 lb).    Diet Recall:  (some usual/recent meals/snacks/beverages):   Meal Food    Breakfast Today: scrambled eggs with cheese and hash brown or spec K cereal with sliced banana and milk.     Lunch Chicken sandwich on wheat bread with small amt of chips   Dinner Mom usually makes dinner. Last night: cheese pizza and a little later 1/2 can ravioli or hamburger helper with green beans OR other beef/chicken/fish with noodles/rice/potatoes, trying to consistently get a veg too   Snacks If hungry, then PBJ sandwich on wheat bread and glass of milk or may have small bowl chips or none   Beverages 1-3 bottles (16.9 oz) Water, 1-2 x 12 oz gatorade zero, sometimes 1 glass 1% milk in evening. Up until about 4 months ago was drinking a lot of soda but now discontinued.   Alcohol Intake none     Frequency of eating/taking out meals: beginning of month went to [employer] but now trying to " "decrease. Recently had been getting fast food several times per week.     Labs:  On 5/13/22: total bilirubin 3.9,  and .  Last Comprehensive Metabolic Panel:  Sodium   Date Value Ref Range Status   06/23/2022 140 133 - 144 mmol/L Final     Potassium   Date Value Ref Range Status   06/23/2022 3.8 3.4 - 5.3 mmol/L Final     Chloride   Date Value Ref Range Status   06/23/2022 106 94 - 109 mmol/L Final     Carbon Dioxide (CO2)   Date Value Ref Range Status   06/23/2022 27 20 - 32 mmol/L Final     Anion Gap   Date Value Ref Range Status   06/23/2022 7 3 - 14 mmol/L Final     Glucose   Date Value Ref Range Status   06/23/2022 91 70 - 99 mg/dL Final     Urea Nitrogen   Date Value Ref Range Status   06/23/2022 10 7 - 30 mg/dL Final     Creatinine   Date Value Ref Range Status   06/23/2022 0.99 0.66 - 1.25 mg/dL Final     GFR Estimate   Date Value Ref Range Status   06/23/2022 >90 >60 mL/min/1.73m2 Final     Comment:     Effective December 21, 2021 eGFRcr in adults is calculated using the 2021 CKD-EPI creatinine equation which includes age and gender (Kevin et al., NEJ, DOI: 10.1056/OAILuz5984159)     Calcium   Date Value Ref Range Status   06/23/2022 9.0 8.5 - 10.1 mg/dL Final     CBC RESULTS:   Recent Labs   Lab Test 02/25/22  1130   HGB 16.8     Pertinent Medications/vitamin and mineral supplements:     Current Outpatient Medications   Medication     albuterol (2.5 MG/3ML) 0.083% nebulizer solution     albuterol (PROAIR HFA/PROVENTIL HFA/VENTOLIN HFA) 108 (90 Base) MCG/ACT inhaler     loratadine (CLARITIN) 10 MG tablet     VITAMIN D, CHOLECALCIFEROL, PO     No current facility-administered medications for this visit.     Food Allergies:  NKFA   Physical Activity:  Less exercise than in the past. Has weights at home. Reported that he is \"lazy\"    MALNUTRITION:  % Weight Loss:  working on intentional weight loss  % Intake:  No decreased intake noted-intentional decreased intake to work on losing " weight.  Subcutaneous Fat Loss:  Unable to assess  Muscle Loss:  Unable to assess  Fluid Retention:  None noted    Malnutrition Diagnosis: Patient does not meet two of the above criteria necessary for diagnosing malnutrition    Nutrition Prescription: Plate method    Nutrition Intervention      Provided diet education review for general healthful diet to help with losing weight. Discussed some specific tips and ideas today based on his usual intake and food preferences.    Goals:    1. Continue eating 3 meals per day and can use the Plate method plan we discussed at last visit for guidance on getting balanced meals.    2. Consistently include some vegetables with your lunch and dinner meals and continue having some fruit such as banana or berries or other fruit with your breakfast meal.    Here are some ideas we discussed for eating more vegetables at meals:    --Can add some lettuce and tomatoes on your chicken or other meat sandwiches.    --Can have some sliced vegetables such as baby carrots, sliced bell peppers, sliced cucumbers or grape tomatoes on the side at lunch time or as a snack in hungry in between meals.    --Consistently eat some vegetables along with/at your dinner meal.    3. Continue to limit snacking in between. If you are truly hungry or if you have a long stretch between meals, then okay to have a healthy snack such as your peanut butter and jelly sandwich on wheat bread, fruit, vegetables, or lowfat cottage cheese or 10-15 nuts. If having chips, then limit to the one small bowl as you are currently doing.    4. Eat slowly and take at least 20 minutes to eat a meal.    5. Increase exercise and aim for starting with exercising at least once per week to start and then increase from there.    6. Drink at least 3-4 of your 16 ounce water bottles per day. (okay to have some gatorade zero, but limit to the 12 oz-16 oz bottle per day, since it has some sodium in it).    Nutrition Monitoring and  Evaluation: Will monitor adherence to nutrition recommendations at future RD visits.     Further Medical Nutrition Therapy:  Follow-up August 17, 2022 at 1:30 PM    Patient was encouraged to call/contact RD with any further questions.    Sadie Brody MS, RD, LD

## 2022-07-13 NOTE — PATIENT INSTRUCTIONS
It was nice speaking with you today. Below are the nutrition recommendations we discussed at your visit.    Please let me know if you have any additional questions.    Nutrition Recommendations    1. Continue eating 3 meals per day and can use the Plate method plan we discussed at last visit for guidance on getting balanced meals.    2. Consistently include some vegetables with your lunch and dinner meals and continue having some fruit such as banana or berries or other fruit with your breakfast meal.    Here are some ideas we discussed for eating more vegetables at meals:    --Can add some lettuce and tomatoes on your chicken or other meat sandwiches.    --Can have some sliced vegetables such as baby carrots, sliced bell peppers, sliced cucumbers or grape tomatoes on the side at lunch time or as a snack in hungry in between meals.    --Consistently eat some vegetables along with/at your dinner meal.    3. Continue to limit snacking in between. If you are truly hungry or if you have a long stretch between meals, then okay to have a healthy snack such as your peanut butter and jelly sandwich on wheat bread, fruit, vegetables, or lowfat cottage cheese or 10-15 nuts. If having chips, then limit to the one small bowl as you are currently doing.    4. Eat slowly and take at least 20 minutes to eat a meal.    5. Increase exercise and aim for starting with exercising at least once per week to start and then increase from there.    6. Drink at least 3-4 of your 16 ounce water bottles per day. (okay to have some gatorade zero, but limit to the 12 oz-16 oz bottle per day, since it has some sodium in it).    Your follow up appointment is scheduled for August 17, 2022 at 1:30 PM. If you need to make any changes to your appointment, please call [phone].    Thank you,    Sadie Brody, MS, RD, LD

## 2022-08-17 ENCOUNTER — VIRTUAL VISIT (OUTPATIENT)
Dept: GASTROENTEROLOGY | Facility: CLINIC | Age: 22
End: 2022-08-17
Payer: COMMERCIAL

## 2022-08-17 DIAGNOSIS — R74.8 ELEVATED LIVER ENZYMES: Primary | ICD-10-CM

## 2022-08-17 PROCEDURE — 97803 MED NUTRITION INDIV SUBSEQ: CPT | Mod: 95 | Performed by: DIETITIAN, REGISTERED

## 2022-08-17 NOTE — PATIENT INSTRUCTIONS
It was nice speaking with you today. Below are the nutrition recommendations we discussed at your visit. Please let me know if you have any additional questions.    Nutrition Recommendations    1. Continue eating 3 meals per day and can use the Plate method plan we discussed at last visit for guidance on getting balanced meals.    2. Consistently include some vegetables with your lunch and dinner meals and have a fruit serving along with your breakfast meal.    3. At lunch have a cup/bowl of some vegetables along with your sandwich. You can still have a small bowl of chips too if you want, but if you include some vegetables too, then you will hopefully not go back for a second bowl of chips.  For vegetables at lunch: you could have some cucumber slices, sliced peppers, carrots/baby carrots, cut up celery, grape tomatoes or cherry tomatoes or a side salad or any other vegetables you like.    4, If you are hungry in between meals or have a long stretch between meals and want a snacks instead of chips you could have one of the following snacks:  --Lowfat cheese stick  --piece of fruit  --10-15 unsalted nuts  --cut up vegetables (like the ones listed above)  --1/2 peanut butter and jelly sandwich    5. Eat slowly and take at least 20 minutes to eat a meal.    6. Increase exercise and aim for starting with exercising at least once per week to start and then increase from there.    7. Increase water to consistently drink at least 3-4 of your 16 ounce water bottles per day. (okay to have some gatorade zero, but limit to one of the 12 oz-16 oz bottle per day, since it has some sodium in it).    I included some handouts in packet for you. The handouts include a couple of plate method handouts, protein sources list, carbohydrate list and mindful eating.    Your follow up appointment is scheduled for September 21, 2022 at 1:30 PM. If you need to make any changes to your appointment, please call [phone].    Thank  you,    Sadie Brody, MS, RD, LD

## 2022-08-17 NOTE — LETTER
"    8/17/2022         RE: Jaxon Yeung  [address]  [facility] [address]        Dear Colleague,    Thank you for referring your patient, Jaxon Yeung, to the [facility] [place]. Please see a copy of my visit note below.      [facility] Outpatient Medical Nutrition Therapy        Time Spent:  46 minutes  Session Type:  Follow up  Referring Physician:  Referred Self   Reason for RD Visit:   Elevated liver enzymes, nutritional counseling     Nutrition Assessment:  Patient is a 22 year old male with history that includes recent elevated liver enzymes, autism, cerebral palsy, congenital single kidney and asthma.     At initial visit, he stated that he would like to get back to eating better and lose weight. He wonders if his eating is contributing to his abnormal labs. He hasn't been eating well lately and used to exercise more but less lately due to less motivation to do so. He reported that he eats more than he \"should\". He's trying to limit his sugar intake and eat less candy because it's \"taking a toll on\" him and he gets stomach discomfort.  For example he bought donuts and had 3 of them and stomach hurt afterwards. He used to drink a lot of soda but stopped about 3-4 months ago, and now he can't drink a lot of soda. Does not eat a lot of vegetable. He's trying to limit his snacking and trying to stop eating candy. He reported eating quickly as well. In 2015, he lost 36 lbs and got down to about ~175-8 lbs. He used to meet with a nutritionist which helped him work on losing weight and at that time he was advised to stop drinking soda and lisandra aid. He stated that at that time, he was more active and used to play some sports with friends but now not going out much and not very active. He has some weights at home as well but not using them.     At follow up visit today 8/17/22, he reported that overall he has been generally following " "the same diet/meal as at previous visit. He discontinued drinking gatorade zero and increased his water to drink 2-3 x 16 oz bottles per day. He was eating 3 eggs at breakfast but now only 2 eggs at that meal. He generally eats 3 meals per day and will have 1-2 snacks in between meals if he is hungry before or after dinner. He continues to avoid sweets but he is eating more chips than he was at last visit. Now he finds that he will go back for a second bowl so eating a medium to large portion of chips. He has been walking with his working on Fridays but otherwise has not been exercising. He has weights at home but needs to find them. He stated that he needs to do some exercise but not always motivated to do it. He is planning to go to the [employer] with friends this summer but has never been and had some questions about his diet and tips for eating foods while there. He also stated that his mother is interested in getting a list of foods for him in the mail.    Patient Active Problem List   Diagnosis     Closed spondylolysis fracture of lumbar vertebra     Spondylolisthesis of lumbar region     Morbid obesity (H)     Height:   Ht Readings from Last 1 Encounters:   05/13/22 1.702 m (5' 7\")     Weight: He obtained weight at his uncle's rehab a week or so ago and was 240 lbs, so staying stable since last visit and down 16 lbs.  Wt Readings from Last 10 Encounters:   06/08/22 116.1 kg (256 lb)   05/13/22 115.6 kg (254 lb 12.8 oz)   02/25/22 120.2 kg (265 lb)   02/16/22 120.3 kg (265 lb 3.2 oz)   07/09/19 120.1 kg (264 lb 11.2 oz)   07/05/19 122.6 kg (270 lb 6 oz)   07/30/15 84.6 kg (186 lb 9.6 oz) (92 %, Z= 1.40)*   06/18/15 86.9 kg (191 lb 9.6 oz) (94 %, Z= 1.54)*   04/30/15 87.6 kg (193 lb 3.2 oz) (95 %, Z= 1.60)*   04/23/15 85.9 kg (189 lb 6.4 oz) (94 %, Z= 1.52)*     * Growth percentiles are based on CDC (Boys, 2-20 Years) data.      BMI: Estimated body mass index is 40.1 kg/m  as calculated from the following:    " "Height as of 5/13/22: 1.702 m (5' 7\").    Weight as of 6/8/22: 116.1 kg (256 lb).    Diet Recall:  (some usual/recent meals/snacks/beverages):   Meal Food    Breakfast This AM: 2 HB eggs and hash brown. Now 2 eggs, Prev 3 HB eggs/Scrambled eggs with cheese and hash brown or spec K cereal with sliced banana and milk     Lunch Chicken sandwich on wheat bread with med-lg amount of chips   Dinner Last night: hamburger helper with broccoli OR other beef/chicken/fish with noodles/rice/potatoes, veg   Snacks If hungry, then PBJ sandwich on wheat bread and glass of milk. Sometimes has chips if his mom is making dinner and he's hungry   Beverages 2-3 bottles (16.9 oz) Water, once in a while 1 glass 1% milk in evening.    Alcohol Intake none     Frequency of eating/taking out meals: decreased.     Labs:  On 5/13/22: total bilirubin 3.9,  and .  Last Comprehensive Metabolic Panel:  Sodium   Date Value Ref Range Status   06/23/2022 140 133 - 144 mmol/L Final     Potassium   Date Value Ref Range Status   06/23/2022 3.8 3.4 - 5.3 mmol/L Final     Chloride   Date Value Ref Range Status   06/23/2022 106 94 - 109 mmol/L Final     Carbon Dioxide (CO2)   Date Value Ref Range Status   06/23/2022 27 20 - 32 mmol/L Final     Anion Gap   Date Value Ref Range Status   06/23/2022 7 3 - 14 mmol/L Final     Glucose   Date Value Ref Range Status   06/23/2022 91 70 - 99 mg/dL Final     Urea Nitrogen   Date Value Ref Range Status   06/23/2022 10 7 - 30 mg/dL Final     Creatinine   Date Value Ref Range Status   06/23/2022 0.99 0.66 - 1.25 mg/dL Final     GFR Estimate   Date Value Ref Range Status   06/23/2022 >90 >60 mL/min/1.73m2 Final     Comment:     Effective December 21, 2021 eGFRcr in adults is calculated using the 2021 CKD-EPI creatinine equation which includes age and gender (Kevin rios al., NEJM, DOI: 10.1056/IBKFvs1591634)     Calcium   Date Value Ref Range Status   06/23/2022 9.0 8.5 - 10.1 mg/dL Final     CBC RESULTS: "   Recent Labs   Lab Test 02/25/22  1130   HGB 16.8     Pertinent Medications/vitamin and mineral supplements:     Current Outpatient Medications   Medication     albuterol (2.5 MG/3ML) 0.083% nebulizer solution     albuterol (PROAIR HFA/PROVENTIL HFA/VENTOLIN HFA) 108 (90 Base) MCG/ACT inhaler     loratadine (CLARITIN) 10 MG tablet     VITAMIN D, CHOLECALCIFEROL, PO     No current facility-administered medications for this visit.     Food Allergies:  NKFA   Physical Activity:  He hasn't been exercising but would like to start. On Fridays, he meets with his worker and goes on walks that day. Has light weights at home but needs to finds them.     MALNUTRITION:  % Weight Loss:  working on intentional weight loss  % Intake:  No decreased intake noted-intentional decreased intake to work on losing weight.  Subcutaneous Fat Loss:  Unable to assess  Muscle Loss:  Unable to assess  Fluid Retention:  None noted    Malnutrition Diagnosis: Patient does not meet two of the above criteria necessary for diagnosing malnutrition    Nutrition Prescription: Plate method    Nutrition Intervention      Provided diet education review for general healthful diet to d/t hx of elevated liver labs and to help with losing weight. Discussed some specific tips and ideas today based on his usual intake and food preferences. Answered his questions re[place] Encouraged increase exercise or activity. See goals below.    Education materials: [employer] Create a healthy plate, Plate method and food list, sources of protein and carbohydrates.    Goals:    1. Continue eating 3 meals per day and can use the Plate method plan we discussed at last visit for guidance on getting balanced meals.    2. Consistently include some vegetables with your lunch and dinner meals and have a fruit serving along with your breakfast meal.    3. At lunch have a cup/bowl of some vegetables along with your sandwich. You can still have a small bowl of chips too  if you want, but if you include some vegetables too, then you will hopefully not go back for a second bowl of chips.  For vegetables at lunch: you could have some cucumber slices, sliced peppers, carrots/baby carrots, cut up celery, grape tomatoes or cherry tomatoes or a side salad or any other vegetables you like.    4, If you are hungry in between meals or have a long stretch between meals and want a snacks instead of chips you could have one of the following snacks:  --Lowfat cheese stick  --piece of fruit  --10-15 unsalted nuts  --cut up vegetables (like the ones listed above)  --1/2 peanut butter and jelly sandwich    5. Eat slowly and take at least 20 minutes to eat a meal.    6. Increase exercise and aim for starting with exercising at least once per week to start and then increase from there.    7. Increase water to consistently drink at least 3-4 of your 16 ounce water bottles per day. (okay to have some gatorade zero, but limit to one of the 12 oz-16 oz bottle per day, since it has some sodium in it).    Links To Handouts:  My Plate   [email or web address]    Create a Plate (How to Build A Healthy Meal)  [email or web address]    Protein Sources for Weight Loss  [email or web address]     Carbohydrates  [email or web address]     Mindful Eating  [email or web address]     Nutrition Monitoring and Evaluation: Will monitor adherence to nutrition recommendations at future RD visits.     Further Medical Nutrition Therapy:  Follow-up scheduled 9/21/22 at 1:30 PM    Patient was encouraged to call/contact RD with any further questions.      Sadie Brody, MS, RD, LD

## 2022-08-17 NOTE — PROGRESS NOTES
"Jaxon is a 22 year old who is being evaluated via a billable video visit.      How would you like to obtain your AVS? Mail a copy  If the video visit is dropped, the invitation should be resent by: Text to cell phone: [phone]  Will anyone else be joining your video visit? No    Video-Visit Details    Video Start Time: 1:27 PM    Type of service:  Video Visit    Video End Time:2:13 PM    Originating Location (pt. Location): Home    Distant Location (provider location):  [facility]STRO[facility]OLOG[facility] [place]     Platform used for Video Visit: [employer]       [facility] Outpatient Medical Nutrition Therapy      Time Spent:  46 minutes  Session Type:  Follow up  Referring Physician:  Referred Self   Reason for RD Visit:   Elevated liver enzymes, nutritional counseling     Nutrition Assessment:  Patient is a 22 year old male with history that includes recent elevated liver enzymes, autism, cerebral palsy, congenital single kidney and asthma.     At initial visit, he stated that he would like to get back to eating better and lose weight. He wonders if his eating is contributing to his abnormal labs. He hasn't been eating well lately and used to exercise more but less lately due to less motivation to do so. He reported that he eats more than he \"should\". He's trying to limit his sugar intake and eat less candy because it's \"taking a toll on\" him and he gets stomach discomfort.  For example he bought donuts and had 3 of them and stomach hurt afterwards. He used to drink a lot of soda but stopped about 3-4 months ago, and now he can't drink a lot of soda. Does not eat a lot of vegetable. He's trying to limit his snacking and trying to stop eating candy. He reported eating quickly as well. In 2015, he lost 36 lbs and got down to about ~175-8 lbs. He used to meet with a nutritionist which helped him work on losing weight and at that time he was advised to stop drinking soda and lisandra aid. He stated " "that at that time, he was more active and used to play some sports with friends but now not going out much and not very active. He has some weights at home as well but not using them.     At follow up visit today 8/17/22, he reported that overall he has been generally following the same diet/meal as at previous visit. He discontinued drinking gatorade zero and increased his water to drink 2-3 x 16 oz bottles per day. He was eating 3 eggs at breakfast but now only 2 eggs at that meal. He generally eats 3 meals per day and will have 1-2 snacks in between meals if he is hungry before or after dinner. He continues to avoid sweets but he is eating more chips than he was at last visit. Now he finds that he will go back for a second bowl so eating a medium to large portion of chips. He has been walking with his working on Fridays but otherwise has not been exercising. He has weights at home but needs to find them. He stated that he needs to do some exercise but not always motivated to do it. [place] but has never been and had some questions about his diet and tips for eating foods while there. He also stated that his mother is interested in getting a list of foods for him in the mail.    Patient Active Problem List   Diagnosis     Closed spondylolysis fracture of lumbar vertebra     Spondylolisthesis of lumbar region     Morbid obesity (H)     Height:   Ht Readings from Last 1 Encounters:   05/13/22 1.702 m (5' 7\")     Weight: He obtained weight at his uncle's rehab a week or so ago and was 240 lbs, so staying stable since last visit and down 16 lbs.  Wt Readings from Last 10 Encounters:   06/08/22 116.1 kg (256 lb)   05/13/22 115.6 kg (254 lb 12.8 oz)   02/25/22 120.2 kg (265 lb)   02/16/22 120.3 kg (265 lb 3.2 oz)   07/09/19 120.1 kg (264 lb 11.2 oz)   07/05/19 122.6 kg (270 lb 6 oz)   07/30/15 84.6 kg (186 lb 9.6 oz) (92 %, Z= 1.40)*   06/18/15 86.9 kg (191 lb 9.6 " "oz) (94 %, Z= 1.54)*   04/30/15 87.6 kg (193 lb 3.2 oz) (95 %, Z= 1.60)*   04/23/15 85.9 kg (189 lb 6.4 oz) (94 %, Z= 1.52)*     * Growth percentiles are based on [facility] (Boys, 2-20 Years) data.      BMI: Estimated body mass index is 40.1 kg/m  as calculated from the following:    Height as of 5/13/22: 1.702 m (5' 7\").    Weight as of 6/8/22: 116.1 kg (256 lb).    Diet Recall:  (some usual/recent meals/snacks/beverages):   Meal Food    Breakfast This AM: 2 HB eggs and hash brown. Now 2 eggs, Prev 3 HB eggs/Scrambled eggs with cheese and hash brown or spec K cereal with sliced banana and milk     Lunch Chicken sandwich on wheat bread with med-lg amount of chips   Dinner Last night: hamburger helper with broccoli OR other beef/chicken/fish with noodles/rice/potatoes, veg   Snacks If hungry, then PBJ sandwich on wheat bread and glass of milk. Sometimes has chips if his mom is making dinner and he's hungry   Beverages 2-3 bottles (16.9 oz) Water, once in a while 1 glass 1% milk in evening.    Alcohol Intake none     Frequency of eating/taking out meals: decreased.     Labs:  On 5/13/22: total bilirubin 3.9,  and .  Last Comprehensive Metabolic Panel:  Sodium   Date Value Ref Range Status   06/23/2022 140 133 - 144 mmol/L Final     Potassium   Date Value Ref Range Status   06/23/2022 3.8 3.4 - 5.3 mmol/L Final     Chloride   Date Value Ref Range Status   06/23/2022 106 94 - 109 mmol/L Final     Carbon Dioxide (CO2)   Date Value Ref Range Status   06/23/2022 27 20 - 32 mmol/L Final     Anion Gap   Date Value Ref Range Status   06/23/2022 7 3 - 14 mmol/L Final     Glucose   Date Value Ref Range Status   06/23/2022 91 70 - 99 mg/dL Final     Urea Nitrogen   Date Value Ref Range Status   06/23/2022 10 7 - 30 mg/dL Final     Creatinine   Date Value Ref Range Status   06/23/2022 0.99 0.66 - 1.25 mg/dL Final     GFR Estimate   Date Value Ref Range Status   06/23/2022 >90 >60 mL/min/1.73m2 Final     Comment:     " Effective December 21, 2021 eGFRcr in adults is calculated using the 2021 CKD-EPI creatinine equation which includes age and gender (Kevin et al., NEJM, DOI: 10.1056/TNGPdu4410894)     Calcium   Date Value Ref Range Status   06/23/2022 9.0 8.5 - 10.1 mg/dL Final     CBC RESULTS:   Recent Labs   Lab Test 02/25/22  1130   HGB 16.8     Pertinent Medications/vitamin and mineral supplements:     Current Outpatient Medications   Medication     albuterol (2.5 MG/3ML) 0.083% nebulizer solution     albuterol (PROAIR HFA/PROVENTIL HFA/VENTOLIN HFA) 108 (90 Base) MCG/ACT inhaler     loratadine (CLARITIN) 10 MG tablet     VITAMIN D, CHOLECALCIFEROL, PO     No current facility-administered medications for this visit.     Food Allergies:  NKFA   Physical Activity:  He hasn't been exercising but would like to start. On Fridays, he meets with his worker and goes on walks that day. Has light weights at home but needs to finds them.     MALNUTRITION:  % Weight Loss:  working on intentional weight loss  % Intake:  No decreased intake noted-intentional decreased intake to work on losing weight.  Subcutaneous Fat Loss:  Unable to assess  Muscle Loss:  Unable to assess  Fluid Retention:  None noted    Malnutrition Diagnosis: Patient does not meet two of the above criteria necessary for diagnosing malnutrition    Nutrition Prescription: Plate method    Nutrition Intervention      Provided diet education review for general healthful diet to d/t hx of elevated liver labs and to help with losing weight. Discussed some specific tips and ideas today based on his usual intake and food preferences. Answered his questions re: [place]. Encouraged increase exercise or activity. See goals below.    Education materials: [facility] Create a healthy plate, Plate method and food list, sources of protein and carbohydrates.    Goals:    1. Continue eating 3 meals per day and can use the Plate method plan we discussed at last visit for guidance  on getting balanced meals.    2. Consistently include some vegetables with your lunch and dinner meals and have a fruit serving along with your breakfast meal.    3. At lunch have a cup/bowl of some vegetables along with your sandwich. You can still have a small bowl of chips too if you want, but if you include some vegetables too, then you will hopefully not go back for a second bowl of chips.  For vegetables at lunch: you could have some cucumber slices, sliced peppers, carrots/baby carrots, cut up celery, grape tomatoes or cherry tomatoes or a side salad or any other vegetables you like.    4, If you are hungry in between meals or have a long stretch between meals and want a snacks instead of chips you could have one of the following snacks:  --Lowfat cheese stick  --piece of fruit  --10-15 unsalted nuts  --cut up vegetables (like the ones listed above)  --1/2 peanut butter and jelly sandwich    5. Eat slowly and take at least 20 minutes to eat a meal.    6. Increase exercise and aim for starting with exercising at least once per week to start and then increase from there.    7. Increase water to consistently drink at least 3-4 of your 16 ounce water bottles per day. (okay to have some gatorade zero, but limit to one of the 12 oz-16 oz bottle per day, since it has some sodium in it).    Links To Handouts:  My Plate   [email or web address]://[email or web address]    Create a Plate (How to Build A Healthy Meal)  [email or web address]    Protein Sources for Weight Loss  [email or web address]://[email or web address]     Carbohydrates  [email or web address]     Mindful Eating  [email or web address]     Nutrition Monitoring and Evaluation: Will monitor adherence to nutrition recommendations at future RD visits.     Further Medical Nutrition Therapy:  Follow-up scheduled 9/21/22 at 1:30 PM    Patient was encouraged to call/contact RD with any further questions.    Sadie Brody, MS, RD, LD

## 2022-09-06 PROCEDURE — 99283 EMERGENCY DEPT VISIT LOW MDM: CPT | Performed by: EMERGENCY MEDICINE

## 2022-09-07 ENCOUNTER — APPOINTMENT (OUTPATIENT)
Dept: GENERAL RADIOLOGY | Facility: CLINIC | Age: 22
End: 2022-09-07
Attending: EMERGENCY MEDICINE
Payer: COMMERCIAL

## 2022-09-07 ENCOUNTER — HOSPITAL ENCOUNTER (EMERGENCY)
Facility: CLINIC | Age: 22
Discharge: HOME | End: 2022-09-07
Attending: EMERGENCY MEDICINE | Admitting: EMERGENCY MEDICINE
Payer: COMMERCIAL

## 2022-09-07 VITALS
HEIGHT: 67 IN | OXYGEN SATURATION: 99 % | RESPIRATION RATE: 14 BRPM | SYSTOLIC BLOOD PRESSURE: 121 MMHG | TEMPERATURE: 98.8 F | HEART RATE: 70 BPM | BODY MASS INDEX: 40.1 KG/M2 | DIASTOLIC BLOOD PRESSURE: 60 MMHG

## 2022-09-07 DIAGNOSIS — S39.012A STRAIN OF LUMBAR REGION, INITIAL ENCOUNTER: ICD-10-CM

## 2022-09-07 PROCEDURE — 72100 X-RAY EXAM L-S SPINE 2/3 VWS: CPT

## 2022-09-07 PROCEDURE — 250N000013 HC RX MED GY IP 250 OP 250 PS 637: Performed by: EMERGENCY MEDICINE

## 2022-09-07 RX ORDER — ACETAMINOPHEN 325 MG/1
975 TABLET ORAL ONCE
Status: COMPLETED | OUTPATIENT
Start: 2022-09-07 | End: 2022-09-07

## 2022-09-07 RX ADMIN — ACETAMINOPHEN 975 MG: 325 TABLET, FILM COATED ORAL at 00:38

## 2022-09-07 ASSESSMENT — ENCOUNTER SYMPTOMS
CONSTIPATION: 0
FREQUENCY: 0
NUMBNESS: 0
ABDOMINAL PAIN: 0
FEVER: 0
HEMATURIA: 0
WEAKNESS: 0
DIFFICULTY URINATING: 0
DIARRHEA: 0
BACK PAIN: 1
DYSURIA: 0

## 2022-09-07 ASSESSMENT — ACTIVITIES OF DAILY LIVING (ADL): ADLS_ACUITY_SCORE: 35

## 2022-09-07 NOTE — DISCHARGE INSTRUCTIONS
Please make an appointment to follow up with Orthopedics Clinic (phone: [phone]) in 5-7 days as needed.    Use acetaminophen 1000 mg every 6 hours or ibuprofen 600 mg every 6 hours as needed for pain.

## 2022-09-07 NOTE — ED TRIAGE NOTES
Pt BIB EMS for L sided back pain that radiates to R side. Pt reports that [place] and bounced around a lot, then 6 days ago he stretched in the bathroom and felt a sharper pain.      Triage Assessment     Row Name 09/07/22 0012       Triage Assessment (Adult)    Airway WDL WDL       Respiratory WDL    Respiratory WDL WDL       Skin Circulation/Temperature WDL    Skin Circulation/Temperature WDL WDL       Cardiac WDL    Cardiac WDL WDL       Peripheral/Neurovascular WDL    Peripheral Neurovascular WDL WDL       Cognitive/Neuro/Behavioral WDL    Cognitive/Neuro/Behavioral WDL WDL

## 2022-09-07 NOTE — ED PROVIDER NOTES
[facility] [place] EMERGENCY DEPARTMENT ([facility])    9/07/22        History     Chief Complaint   Patient presents with     Back Pain     Pt BIB EMS for L sided back pain that radiates to R side. Pt reports that [place] and bounced around a lot, then 6 days ago he stretched in the bathroom and felt a sharper pain.      The history is provided by the patient and medical records.     Jaxon Yeung is a 22 year old male with a history of cerebral palsy, spondylolisthesis of lumbar back who presents to the Emergency Department with back pain. Patient reports on 8[place]29 he [place] the [place] and [place]. He states he has a fear of heights and panicked when going down the slide, states the ride was not smooth and he was bouncing around. Patient reports 2 days later he stretched and felt a sharp pain in his low back. Patient denies pain initially after going down the slide. Patient is now having pain across his entire lower back. He reports 8 years ago he had a fracture in this area. Patient reports pain is achy, sometimes can be sharp. Pain resolves with rest. Patient reports today throughout the day pain was not bothering him as well, then started intensifying.  Patient notes the past 3 days his mother has had to help him put his feet up to lay down several times, states it hurt too much to lift his legs. Patient denies pain radiating into his legs. No numbness or weakness in his legs. No loss of bowel or bladder control. Patient denies urinary symptoms, abdominal pain, or change in BMs. Patient denies fevers. No recent chiropractic manipulations or injections in his back. No IV drug use. He has not been taking any medications for the pain. Patient notes he had an orthopedics appointment scheduled on 9/5, however, his back was feeling better so he did not go.    Past Medical History  Past Medical History:   Diagnosis Date     Autism       "Cerebral hemorrhage at birth      Congenital single kidney      CP (cerebral palsy) (H)      Premature baby      Uncomplicated asthma      History reviewed. No pertinent surgical history.  albuterol (PROAIR HFA/PROVENTIL HFA/VENTOLIN HFA) 108 (90 Base) MCG/ACT inhaler  loratadine (CLARITIN) 10 MG tablet  VITAMIN D, CHOLECALCIFEROL, PO  albuterol (2.5 MG/3ML) 0.083% nebulizer solution      Allergies   Allergen Reactions     Clindamycin      Family History  Family History   Problem Relation Age of Onset     Coronary Artery Disease Mother      Asthma Mother      Cerebrovascular Disease Father      Prostate Cancer Father      Social History   Social History     Tobacco Use     Smoking status: Never Smoker     Smokeless tobacco: Never Used   Vaping Use     Vaping Use: Never used   Substance Use Topics     Alcohol use: Never     Drug use: Never      Past medical history, past surgical history, medications, allergies, family history, and social history were reviewed with the patient. No additional pertinent items.       Review of Systems   Constitutional: Negative for fever.   Gastrointestinal: Negative for abdominal pain, constipation and diarrhea.   Genitourinary: Negative for difficulty urinating, dysuria, frequency, hematuria and urgency.   Musculoskeletal: Positive for back pain.   Neurological: Negative for weakness and numbness.   All other systems reviewed and are negative.    A complete review of systems was performed with pertinent positives and negatives noted in the HPI, and all other systems negative.    Physical Exam   BP: 121/60  Pulse: 70  Temp: 98.8  F (37.1  C)  Resp: 14  Height: 170.2 cm (5' 7\")  SpO2: 99 %  Physical Exam  General: patient is alert and oriented and in no acute distress   Head: atraumatic and normocephalic   EENT: moist mucus membranes, sclera anicteric   Neck: supple   Cardiovascular: regular rate and rhythm, extremities warm and well perfused, no lower extremity edema, 2+ PT " pulses  Pulmonary: lungs clear to auscultation bilaterally   Abdomen: soft, non-tender, no CVA TTP  Musculoskeletal: normal range of motion of the extremities, no midline TTP  Neurological: alert and oriented, moving all extremities symmetrically, strength 5/5 and symmetric in hip flexion/extension, knee flexion/extension and ankle plantar/dorsiflexion, sensation to light touch in lower extremities intact, normal gait   Skin: warm, dry   ED Course   12:20 AM  The patient was seen and examined by Jena Bates MD in Room ED03.     Procedures                     No results found for any visits on 09/07/22.  Medications - No data to display     Assessments & Plan (with Medical Decision Making)   Mr. Yeung is a 22 year old male with a history of cerebral palsy, spondylolisthesis of lumbar back who presents to the Emergency Department with back pain.  He is hemodynamically stable and afebrile.  He is neurovascularly intact.  He does not have any red flag findings for emergent pathology such as epidural abscess, epidural hematoma, meningitis, UTI, ureterolithiasis, pyelonephritis, AAA, intra-abdominal infection.  He has had a prior fracture in this area and did have an x-ray of the lumbar spine which shows no acute fracture.  He was given acetaminophen.  We will plan to discharge to home with symptomatic management.  He was instructed to follow-up with orthopedics in clinic if his pain should persist.  He was given close return precautions for the emergency department and voiced understanding.    I have reviewed the nursing notes. I have reviewed the findings, diagnosis, plan and need for follow up with the patient.    New Prescriptions    No medications on file       Final diagnoses:   Strain of lumbar region, initial encounter     Sobeida CALLAHAN, castillo serving as a trained medical scribe to document services personally performed by Jena Bates MD, based on the provider's statements to me.      Jena CALLAHAN  MD Willard, was physically present and have reviewed and verified the accuracy of this note documented by Sobeida Delgado.     --  Jena Bates MD  [facility] EMERGENCY DEPARTMENT  9/6/2022     Jena Bates MD  09/07/22 0134

## 2023-03-23 ENCOUNTER — OFFICE VISIT (OUTPATIENT)
Dept: FAMILY MEDICINE | Facility: CLINIC | Age: 23
End: 2023-03-23
Payer: COMMERCIAL

## 2023-03-23 VITALS
HEART RATE: 67 BPM | BODY MASS INDEX: 36.88 KG/M2 | RESPIRATION RATE: 19 BRPM | OXYGEN SATURATION: 100 % | SYSTOLIC BLOOD PRESSURE: 128 MMHG | WEIGHT: 235 LBS | DIASTOLIC BLOOD PRESSURE: 79 MMHG | TEMPERATURE: 98.5 F | HEIGHT: 67 IN

## 2023-03-23 DIAGNOSIS — Z00.00 ANNUAL PHYSICAL EXAM: Primary | ICD-10-CM

## 2023-03-23 DIAGNOSIS — R74.8 ELEVATED LIVER ENZYMES: ICD-10-CM

## 2023-03-23 DIAGNOSIS — Q60.0 CONGENITAL SINGLE KIDNEY: ICD-10-CM

## 2023-03-23 DIAGNOSIS — E66.9 OBESITY, UNSPECIFIED CLASSIFICATION, UNSPECIFIED OBESITY TYPE, UNSPECIFIED WHETHER SERIOUS COMORBIDITY PRESENT: ICD-10-CM

## 2023-03-23 DIAGNOSIS — F84.0 AUTISM: ICD-10-CM

## 2023-03-23 PROCEDURE — 99395 PREV VISIT EST AGE 18-39: CPT | Performed by: FAMILY MEDICINE

## 2023-03-23 ASSESSMENT — ENCOUNTER SYMPTOMS
HEMATURIA: 0
ARTHRALGIAS: 0
DYSURIA: 0
NERVOUS/ANXIOUS: 0
FREQUENCY: 0
JOINT SWELLING: 0
SHORTNESS OF BREATH: 0
DIZZINESS: 0
DIARRHEA: 0
FEVER: 0
HEARTBURN: 0
PALPITATIONS: 0
HEMATOCHEZIA: 0
COUGH: 0
WEAKNESS: 0
PARESTHESIAS: 0
CHILLS: 0
HEADACHES: 0
MYALGIAS: 0
NAUSEA: 0
ABDOMINAL PAIN: 0
EYE PAIN: 0
CONSTIPATION: 0
SORE THROAT: 0

## 2023-03-23 ASSESSMENT — ASTHMA QUESTIONNAIRES
QUESTION_4 LAST FOUR WEEKS HOW OFTEN HAVE YOU USED YOUR RESCUE INHALER OR NEBULIZER MEDICATION (SUCH AS ALBUTEROL): NOT AT ALL
ACT_TOTALSCORE: 25
QUESTION_5 LAST FOUR WEEKS HOW WOULD YOU RATE YOUR ASTHMA CONTROL: COMPLETELY CONTROLLED
QUESTION_2 LAST FOUR WEEKS HOW OFTEN HAVE YOU HAD SHORTNESS OF BREATH: NOT AT ALL
QUESTION_3 LAST FOUR WEEKS HOW OFTEN DID YOUR ASTHMA SYMPTOMS (WHEEZING, COUGHING, SHORTNESS OF BREATH, CHEST TIGHTNESS OR PAIN) WAKE YOU UP AT NIGHT OR EARLIER THAN USUAL IN THE MORNING: NOT AT ALL
QUESTION_1 LAST FOUR WEEKS HOW MUCH OF THE TIME DID YOUR ASTHMA KEEP YOU FROM GETTING AS MUCH DONE AT WORK, SCHOOL OR AT HOME: NONE OF THE TIME
ACT_TOTALSCORE: 25

## 2023-03-23 ASSESSMENT — PAIN SCALES - GENERAL: PAINLEVEL: NO PAIN (0)

## 2023-03-23 NOTE — PATIENT INSTRUCTIONS
In February 2022, your weight was 265 lbs. Today, your weight is 235 lbs.    Keep exercising and staying away from pop.    I recommend blood tests for your kidney and liver. Let me know if you want to schedule these.    I recommend getting the COVID shot. Because you have asthma, you are at high risk of getting a bad case of COVID.    Please see Dr. Blanco or me in 6 months.

## 2023-03-28 PROBLEM — Q60.0 CONGENITAL SINGLE KIDNEY: Status: ACTIVE | Noted: 2023-03-28

## 2023-03-28 PROBLEM — E66.9 OBESITY: Status: ACTIVE | Noted: 2019-07-09

## 2023-03-28 PROBLEM — F84.0 AUTISM: Status: ACTIVE | Noted: 2023-03-28

## 2023-03-28 PROBLEM — G80.9 CEREBRAL PALSY (H): Status: ACTIVE | Noted: 2023-03-28

## 2023-03-28 PROBLEM — J45.909 ASTHMA: Status: ACTIVE | Noted: 2023-03-28

## 2023-05-01 NOTE — RESULT ENCOUNTER NOTE
Vlad Bella,    Did the patient call regarding these results? They are from last June.    ThanksBerhane

## 2024-08-28 DIAGNOSIS — J45.20 MILD INTERMITTENT ASTHMA WITHOUT COMPLICATION: ICD-10-CM

## 2024-08-28 RX ORDER — ALBUTEROL SULFATE 90 UG/1
2 AEROSOL, METERED RESPIRATORY (INHALATION) EVERY 6 HOURS PRN
Qty: 18 G | Refills: 1 | Status: SHIPPED | OUTPATIENT
Start: 2024-08-28 | End: 2024-09-16

## 2024-08-28 NOTE — TELEPHONE ENCOUNTER
Mom calling to get a refill.. Pt does is out of his inhaler. Medication and pharmacy have been pended. Has an appointment on 9/16/24. Thanks    Please call [phone] once order has been placed.     Delfina Fish RN  [facility]

## 2024-09-16 ENCOUNTER — OFFICE VISIT (OUTPATIENT)
Dept: FAMILY MEDICINE | Facility: CLINIC | Age: 24
End: 2024-09-16
Payer: COMMERCIAL

## 2024-09-16 VITALS
SYSTOLIC BLOOD PRESSURE: 136 MMHG | OXYGEN SATURATION: 96 % | HEIGHT: 67 IN | HEART RATE: 69 BPM | BODY MASS INDEX: 42.38 KG/M2 | RESPIRATION RATE: 23 BRPM | DIASTOLIC BLOOD PRESSURE: 86 MMHG | WEIGHT: 270 LBS | TEMPERATURE: 97 F

## 2024-09-16 DIAGNOSIS — Z00.00 VISIT FOR PREVENTIVE HEALTH EXAMINATION: Primary | ICD-10-CM

## 2024-09-16 DIAGNOSIS — E66.01 SEVERE OBESITY (BMI >= 40) (H): ICD-10-CM

## 2024-09-16 DIAGNOSIS — J45.20 MILD INTERMITTENT ASTHMA WITHOUT COMPLICATION: ICD-10-CM

## 2024-09-16 PROCEDURE — 99395 PREV VISIT EST AGE 18-39: CPT | Performed by: FAMILY MEDICINE

## 2024-09-16 RX ORDER — ALBUTEROL SULFATE 90 UG/1
2 AEROSOL, METERED RESPIRATORY (INHALATION) EVERY 6 HOURS PRN
Qty: 18 G | Refills: 11 | Status: SHIPPED | OUTPATIENT
Start: 2024-09-16

## 2024-09-16 ASSESSMENT — ASTHMA QUESTIONNAIRES
QUESTION_3 LAST FOUR WEEKS HOW OFTEN DID YOUR ASTHMA SYMPTOMS (WHEEZING, COUGHING, SHORTNESS OF BREATH, CHEST TIGHTNESS OR PAIN) WAKE YOU UP AT NIGHT OR EARLIER THAN USUAL IN THE MORNING: NOT AT ALL
QUESTION_4 LAST FOUR WEEKS HOW OFTEN HAVE YOU USED YOUR RESCUE INHALER OR NEBULIZER MEDICATION (SUCH AS ALBUTEROL): NOT AT ALL
QUESTION_5 LAST FOUR WEEKS HOW WOULD YOU RATE YOUR ASTHMA CONTROL: COMPLETELY CONTROLLED
QUESTION_2 LAST FOUR WEEKS HOW OFTEN HAVE YOU HAD SHORTNESS OF BREATH: NOT AT ALL
ACT_TOTALSCORE: 25
QUESTION_1 LAST FOUR WEEKS HOW MUCH OF THE TIME DID YOUR ASTHMA KEEP YOU FROM GETTING AS MUCH DONE AT WORK, SCHOOL OR AT HOME: NONE OF THE TIME
ACT_TOTALSCORE: 25

## 2024-09-16 ASSESSMENT — PAIN SCALES - GENERAL: PAINLEVEL: NO PAIN (0)

## 2024-09-16 NOTE — PATIENT INSTRUCTIONS
Weight is up this year  Try switching from regular soda to low-sugar soda  I will talk to physical therapist to see if they can help with learning to bike  Think about restarting adaptive floor hockey    Bring inhaler to work  Let me know if you feel short of breath and the inhaler is not working    Call me at [phone] if you decide you want to do the blood test or the vaccines    Let's meet again next August

## 2024-09-16 NOTE — PROGRESS NOTES
Preventive Care Visit  [facility] INTEGRATED PRIMARY CARE  Galen Hair MD, Family Medicine  Sep 16, 2024  {Provider  Link to SmartSet :448770}    {PROVIDER CHARTING PREFERENCE:731115}    Judy Coates is a 24 year old, presenting for the following:  Physical        9/16/2024     1:59 PM   Additional Questions   Roomed by Linda Evans        Health Care Directive  Patient does not have a Health Care Directive or Living Will: {ADVANCE_DIRECTIVE_STATUS:564242}    HPI  ***  {MA/LPN/RN Pre-Provider Visit Orders- hCG/UA/Strep (Optional):957209}  {SUPERLIST (Optional):121803}  {additonal problems for provider to add (Optional):111246}      9/16/2024   General Health   How would you rate your overall physical health? Excellent   Feel stress (tense, anxious, or unable to sleep) Not at all            9/16/2024   Nutrition   Three or more servings of calcium each day? Yes   Diet: Regular (no restrictions)   How many servings of fruit and vegetables per day? (!) 2-3   How many sweetened beverages each day? 0-1            9/16/2024   Exercise   Days per week of moderate/strenous exercise 3 days   Average minutes spent exercising at this level 30 min            9/16/2024   Social Factors   Frequency of gathering with friends or relatives More than three times a week   Worry food won't last until get money to buy more No   Food not last or not have enough money for food? No   Do you have housing? (Housing is defined as stable permanent housing and does not include staying ouside in a car, in a tent, in an abandoned building, in an overnight shelter, or couch-surfing.) Yes   Are you worried about losing your housing? No   Lack of transportation? No   Unable to get utilities (heat,electricity)? No            9/16/2024   Dental   Dentist two times every year? (!) NO            9/16/2024   TB Screening   Were you born outside of the [place]? No            Today's PHQ-2 Score:       9/16/2024     1:48 PM   PHQ-2  "( 1999 Pfizer)   Q1: Little interest or pleasure in doing things 0   Q2: Feeling down, depressed or hopeless 0   PHQ-2 Score 0   Q1: Little interest or pleasure in doing things Not at all   Q2: Feeling down, depressed or hopeless Not at all   PHQ-2 Score 0           9/16/2024   Substance Use   Alcohol more than 3/day or more than 7/wk Not Applicable   Do you use any other substances recreationally? No        Social History     Tobacco Use    Smoking status: Never    Smokeless tobacco: Never   Vaping Use    Vaping status: Never Used   Substance Use Topics    Alcohol use: Never    Drug use: Never     {Provider  If there are gaps in the social history shown above, please follow the link to update and then refresh the note Link to Social and Substance History :557919}        9/16/2024   One time HIV Screening   Previous HIV test? Decline          9/16/2024   STI Screening   New sexual partner(s) since last STI/HIV test? (!) DECLINE            9/16/2024   Contraception/Family Planning   Questions about contraception or family planning (!) DECLINE        {Provider  REQUIRED FOR AWV Use the storyboard to review patient history, after sections have been marked as reviewed, refresh note to capture documentation:016179}   Reviewed and updated as needed this visit by Provider                    {HISTORY OPTIONS (Optional):214287}    {ROS Picklists (Optional):279356}     Objective    Exam  /86   Pulse 69   Temp 97  F (36.1  C) (Temporal)   Resp 23   Ht 1.703 m (5' 7.05\")   Wt 122.5 kg (270 lb)   SpO2 96%   BMI 42.23 kg/m     Estimated body mass index is 42.23 kg/m  as calculated from the following:    Height as of this encounter: 1.703 m (5' 7.05\").    Weight as of this encounter: 122.5 kg (270 lb).    Physical Exam  {Exam Choices (Optional):562481}        Signed Electronically by: Galen Hair MD  {Email feedback regarding this note to [email or web address]   :452673}  "

## 2024-09-16 NOTE — PROGRESS NOTES
"Assessment/Plan    Preventive.  -Declines labs and vaccines today.  Has anxiety around needles    Obesity.  Weight up about 10% in past year.  -Encouraged switching to low-sugar soda  -Encouraged increased physical activity.  Discussed jogging, biking, returning to [employer] hockey    Next visit in 3-6 months for weight.      ----  Chief complaint: Physical    Social History     Social History Narrative    -Lives with mother and brother    -Graduated high school    -Works in security at [employer]    -Has ILS worker        Updated 9/17/2024     Recently went to the fair.  Enjoyed the cookies.    Working in security at [employer].  Had a stressful encounter with a customer recently.  Considering changing jobs.    Weight up about 30 pounds from last year.  Has started drinking soda again.  Patient does not feel that weight interferes with activity, but he is sometimes self-conscious regarding his weight.  Has not been physically active recently.  Enjoys jogging.  Has a bike at home and would like to learn how to ride it, though he worries coordination issues from cerebral palsy could be a barrier.    Exam  /86   Pulse 69   Temp 97  F (36.1  C) (Temporal)   Resp 23   Ht 1.703 m (5' 7.05\")   Wt 122.5 kg (270 lb)   SpO2 96%   BMI 42.23 kg/m      Wt Readings from Last 5 Encounters:   09/16/24 122.5 kg (270 lb)   03/23/23 106.6 kg (235 lb)   06/08/22 116.1 kg (256 lb)   05/13/22 115.6 kg (254 lb 12.8 oz)   02/25/22 120.2 kg (265 lb)     No thyromegaly  Heart with regular rate and rhythm, no murmurs  Lung fields clear to auscultation bilaterally  No lower leg edema  "

## 2024-11-03 ENCOUNTER — TELEPHONE (OUTPATIENT)
Dept: FAMILY MEDICINE | Facility: CLINIC | Age: 24
End: 2024-11-03
Payer: COMMERCIAL

## 2024-11-03 DIAGNOSIS — R27.9 INCOORDINATION: ICD-10-CM

## 2024-11-03 DIAGNOSIS — G80.9 CEREBRAL PALSY, UNSPECIFIED TYPE (H): Primary | ICD-10-CM

## 2024-11-04 NOTE — TELEPHONE ENCOUNTER
Please call Jaxon:    Seen in September. We talked about PT to help with his coordination. I checked with PT and they think this could be helpful. I forgot to update [email or web address]    Please let Jaxon know that he can call [phone] to schedule. Thanks.